# Patient Record
Sex: MALE | Race: WHITE | NOT HISPANIC OR LATINO | URBAN - METROPOLITAN AREA
[De-identification: names, ages, dates, MRNs, and addresses within clinical notes are randomized per-mention and may not be internally consistent; named-entity substitution may affect disease eponyms.]

---

## 2017-04-18 ENCOUNTER — INPATIENT (INPATIENT)
Facility: HOSPITAL | Age: 57
LOS: 29 days | Discharge: NURSING, REHAB OR LONG-TERM CARE | End: 2017-05-18
Attending: HOSPITALIST

## 2017-06-27 DIAGNOSIS — S72.041A DISPLACED FRACTURE OF BASE OF NECK OF RIGHT FEMUR, INITIAL ENCOUNTER FOR CLOSED FRACTURE: ICD-10-CM

## 2017-06-27 DIAGNOSIS — F10.229 ALCOHOL DEPENDENCE WITH INTOXICATION, UNSPECIFIED: ICD-10-CM

## 2017-06-27 DIAGNOSIS — K72.90 HEPATIC FAILURE, UNSPECIFIED WITHOUT COMA: ICD-10-CM

## 2017-06-27 DIAGNOSIS — G93.41 METABOLIC ENCEPHALOPATHY: ICD-10-CM

## 2017-06-27 DIAGNOSIS — H54.2 LOW VISION, BOTH EYES: ICD-10-CM

## 2017-06-27 DIAGNOSIS — R45.1 RESTLESSNESS AND AGITATION: ICD-10-CM

## 2017-06-27 DIAGNOSIS — R45.851 SUICIDAL IDEATIONS: ICD-10-CM

## 2017-06-27 DIAGNOSIS — F32.9 MAJOR DEPRESSIVE DISORDER, SINGLE EPISODE, UNSPECIFIED: ICD-10-CM

## 2017-06-27 DIAGNOSIS — S40.012A CONTUSION OF LEFT SHOULDER, INITIAL ENCOUNTER: ICD-10-CM
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
## 2017-06-27 DIAGNOSIS — E63.9 NUTRITIONAL DEFICIENCY, UNSPECIFIED: ICD-10-CM

## 2017-06-27 DIAGNOSIS — K70.30 ALCOHOLIC CIRRHOSIS OF LIVER WITHOUT ASCITES: ICD-10-CM

## 2017-06-27 DIAGNOSIS — Z78.1 PHYSICAL RESTRAINT STATUS: ICD-10-CM

## 2017-06-27 DIAGNOSIS — S32.030A WEDGE COMPRESSION FRACTURE OF THIRD LUMBAR VERTEBRA, INITIAL ENCOUNTER FOR CLOSED FRACTURE: ICD-10-CM

## 2017-06-27 DIAGNOSIS — Z90.49 ACQUIRED ABSENCE OF OTHER SPECIFIED PARTS OF DIGESTIVE TRACT: ICD-10-CM

## 2017-06-27 DIAGNOSIS — F10.231 ALCOHOL DEPENDENCE WITH WITHDRAWAL DELIRIUM: ICD-10-CM

## 2017-06-27 DIAGNOSIS — K70.10 ALCOHOLIC HEPATITIS WITHOUT ASCITES: ICD-10-CM

## 2017-06-27 DIAGNOSIS — M10.9 GOUT, UNSPECIFIED: ICD-10-CM

## 2017-06-27 DIAGNOSIS — F41.9 ANXIETY DISORDER, UNSPECIFIED: ICD-10-CM
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
## 2017-06-27 DIAGNOSIS — D69.6 THROMBOCYTOPENIA, UNSPECIFIED: ICD-10-CM

## 2017-06-27 DIAGNOSIS — D53.9 NUTRITIONAL ANEMIA, UNSPECIFIED: ICD-10-CM

## 2017-06-27 DIAGNOSIS — D68.9 COAGULATION DEFECT, UNSPECIFIED: ICD-10-CM

## 2017-06-27 DIAGNOSIS — E87.6 HYPOKALEMIA: ICD-10-CM
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
## 2017-06-27 DIAGNOSIS — Z63.4 DISAPPEARANCE AND DEATH OF FAMILY MEMBER: ICD-10-CM
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
## 2017-06-27 DIAGNOSIS — R19.7 DIARRHEA, UNSPECIFIED: ICD-10-CM

## 2017-06-27 DIAGNOSIS — I10 ESSENTIAL (PRIMARY) HYPERTENSION: ICD-10-CM

## 2017-06-27 SDOH — SOCIAL STABILITY - SOCIAL INSECURITY: DISSAPEARANCE AND DEATH OF FAMILY MEMBER: Z63.4

## 2017-08-10 ENCOUNTER — OUTPATIENT (OUTPATIENT)
Dept: OUTPATIENT SERVICES | Facility: HOSPITAL | Age: 57
LOS: 1 days | Discharge: HOME | End: 2017-08-10
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
## 2017-08-10 DIAGNOSIS — I20.9 ANGINA PECTORIS, UNSPECIFIED: ICD-10-CM

## 2017-08-10 DIAGNOSIS — M54.9 DORSALGIA, UNSPECIFIED: ICD-10-CM

## 2017-08-10 DIAGNOSIS — F10.20 ALCOHOL DEPENDENCE, UNCOMPLICATED: ICD-10-CM

## 2017-08-10 DIAGNOSIS — F93.0 SEPARATION ANXIETY DISORDER OF CHILDHOOD: ICD-10-CM

## 2017-08-10 DIAGNOSIS — R16.0 HEPATOMEGALY, NOT ELSEWHERE CLASSIFIED: ICD-10-CM

## 2017-08-10 DIAGNOSIS — I10 ESSENTIAL (PRIMARY) HYPERTENSION: ICD-10-CM

## 2017-08-10 DIAGNOSIS — F41.9 ANXIETY DISORDER, UNSPECIFIED: ICD-10-CM

## 2017-08-23 ENCOUNTER — OUTPATIENT (OUTPATIENT)
Dept: OUTPATIENT SERVICES | Facility: HOSPITAL | Age: 57
LOS: 1 days | Discharge: HOME | End: 2017-08-23

## 2017-08-23 DIAGNOSIS — I10 ESSENTIAL (PRIMARY) HYPERTENSION: ICD-10-CM
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
## 2017-08-23 DIAGNOSIS — M54.9 DORSALGIA, UNSPECIFIED: ICD-10-CM

## 2017-08-23 DIAGNOSIS — F10.20 ALCOHOL DEPENDENCE, UNCOMPLICATED: ICD-10-CM

## 2017-08-23 DIAGNOSIS — F93.0 SEPARATION ANXIETY DISORDER OF CHILDHOOD: ICD-10-CM

## 2017-08-23 DIAGNOSIS — F41.9 ANXIETY DISORDER, UNSPECIFIED: ICD-10-CM

## 2017-08-23 DIAGNOSIS — R16.0 HEPATOMEGALY, NOT ELSEWHERE CLASSIFIED: ICD-10-CM

## 2018-03-28 ENCOUNTER — APPOINTMENT (OUTPATIENT)
Dept: CARDIOLOGY | Facility: CLINIC | Age: 58
End: 2018-03-28

## 2018-03-28 ENCOUNTER — OUTPATIENT (OUTPATIENT)
Dept: OUTPATIENT SERVICES | Facility: HOSPITAL | Age: 58
LOS: 1 days | Discharge: HOME | End: 2018-03-28

## 2018-03-28 VITALS
DIASTOLIC BLOOD PRESSURE: 62 MMHG | WEIGHT: 200 LBS | BODY MASS INDEX: 28 KG/M2 | HEART RATE: 59 BPM | SYSTOLIC BLOOD PRESSURE: 110 MMHG | HEIGHT: 71 IN

## 2018-03-28 DIAGNOSIS — F25.9 SCHIZOAFFECTIVE DISORDER, UNSPECIFIED: ICD-10-CM

## 2018-03-28 DIAGNOSIS — Z01.818 ENCOUNTER FOR OTHER PREPROCEDURAL EXAMINATION: ICD-10-CM

## 2018-03-28 RX ORDER — LACTULOSE 10 G/15ML
10 SOLUTION ORAL DAILY
Refills: 0 | Status: ACTIVE | COMMUNITY

## 2018-03-28 RX ORDER — HALOPERIDOL 0.5 MG/1
0.5 TABLET ORAL
Refills: 0 | Status: ACTIVE | COMMUNITY

## 2018-03-28 RX ORDER — RIFAXIMIN 550 MG/1
550 TABLET ORAL TWICE DAILY
Refills: 0 | Status: ACTIVE | COMMUNITY

## 2018-03-28 RX ORDER — ENALAPRIL MALEATE 20 MG/1
20 TABLET ORAL DAILY
Refills: 0 | Status: ACTIVE | COMMUNITY

## 2018-03-28 RX ORDER — HYDROCORTISONE 1 %
12 CREAM (GRAM) TOPICAL
Refills: 0 | Status: ACTIVE | COMMUNITY

## 2018-03-28 RX ORDER — LABETALOL HYDROCHLORIDE 100 MG/1
100 TABLET, FILM COATED ORAL
Refills: 0 | Status: ACTIVE | COMMUNITY

## 2018-03-28 RX ORDER — TRAZODONE HYDROCHLORIDE 50 MG/1
50 TABLET ORAL DAILY
Refills: 0 | Status: ACTIVE | COMMUNITY

## 2018-03-28 RX ORDER — POTASSIUM CHLORIDE 750 MG/1
10 CAPSULE, EXTENDED RELEASE ORAL TWICE DAILY
Refills: 0 | Status: ACTIVE | COMMUNITY

## 2018-04-08 LAB
ANION GAP SERPL CALC-SCNC: 12 MMOL/L
BUN SERPL-MCNC: 15 MG/DL
CALCIUM SERPL-MCNC: 8.9 MG/DL
CHLORIDE SERPL-SCNC: 103 MMOL/L
CO2 SERPL-SCNC: 27 MMOL/L
CREAT SERPL-MCNC: 1 MG/DL
GLUCOSE SERPL-MCNC: 77 MG/DL
POTASSIUM SERPL-SCNC: 4.6 MMOL/L
SODIUM SERPL-SCNC: 142 MMOL/L

## 2018-04-19 ENCOUNTER — OUTPATIENT (OUTPATIENT)
Dept: OUTPATIENT SERVICES | Facility: HOSPITAL | Age: 58
LOS: 1 days | Discharge: HOME | End: 2018-04-19

## 2018-04-19 DIAGNOSIS — I10 ESSENTIAL (PRIMARY) HYPERTENSION: ICD-10-CM

## 2018-05-23 ENCOUNTER — OUTPATIENT (OUTPATIENT)
Dept: OUTPATIENT SERVICES | Facility: HOSPITAL | Age: 58
LOS: 1 days | Discharge: HOME | End: 2018-05-23

## 2018-05-23 DIAGNOSIS — S72.041A DISPLACED FRACTURE OF BASE OF NECK OF RIGHT FEMUR, INITIAL ENCOUNTER FOR CLOSED FRACTURE: ICD-10-CM

## 2018-05-24 ENCOUNTER — APPOINTMENT (OUTPATIENT)
Dept: CARDIOLOGY | Facility: CLINIC | Age: 58
End: 2018-05-24

## 2018-05-24 VITALS
WEIGHT: 200 LBS | HEART RATE: 64 BPM | BODY MASS INDEX: 28 KG/M2 | HEIGHT: 71 IN | DIASTOLIC BLOOD PRESSURE: 60 MMHG | SYSTOLIC BLOOD PRESSURE: 100 MMHG

## 2018-05-24 DIAGNOSIS — I10 ESSENTIAL (PRIMARY) HYPERTENSION: ICD-10-CM

## 2018-05-24 DIAGNOSIS — F25.9 SCHIZOAFFECTIVE DISORDER, UNSPECIFIED: ICD-10-CM

## 2018-05-24 DIAGNOSIS — Z01.818 ENCOUNTER FOR OTHER PREPROCEDURAL EXAMINATION: ICD-10-CM

## 2018-06-05 ENCOUNTER — OUTPATIENT (OUTPATIENT)
Dept: OUTPATIENT SERVICES | Facility: HOSPITAL | Age: 58
LOS: 1 days | Discharge: HOME | End: 2018-06-05

## 2018-06-05 VITALS
DIASTOLIC BLOOD PRESSURE: 87 MMHG | HEIGHT: 71 IN | WEIGHT: 178.57 LBS | RESPIRATION RATE: 16 BRPM | TEMPERATURE: 97 F | HEART RATE: 60 BPM | SYSTOLIC BLOOD PRESSURE: 134 MMHG | OXYGEN SATURATION: 98 %

## 2018-06-05 DIAGNOSIS — S72.001A FRACTURE OF UNSPECIFIED PART OF NECK OF RIGHT FEMUR, INITIAL ENCOUNTER FOR CLOSED FRACTURE: ICD-10-CM

## 2018-06-05 DIAGNOSIS — Z01.818 ENCOUNTER FOR OTHER PREPROCEDURAL EXAMINATION: ICD-10-CM

## 2018-06-05 DIAGNOSIS — K82.9 DISEASE OF GALLBLADDER, UNSPECIFIED: Chronic | ICD-10-CM

## 2018-06-05 LAB
ALBUMIN SERPL ELPH-MCNC: 4.3 G/DL — SIGNIFICANT CHANGE UP (ref 3.5–5.2)
ALP SERPL-CCNC: 106 U/L — SIGNIFICANT CHANGE UP (ref 30–115)
ALT FLD-CCNC: 12 U/L — SIGNIFICANT CHANGE UP (ref 0–41)
ANION GAP SERPL CALC-SCNC: 14 MMOL/L — SIGNIFICANT CHANGE UP (ref 7–14)
APPEARANCE UR: (no result)
APTT BLD: 31.9 SEC — SIGNIFICANT CHANGE UP (ref 27–39.2)
AST SERPL-CCNC: 16 U/L — SIGNIFICANT CHANGE UP (ref 0–41)
BASOPHILS # BLD AUTO: 0.02 K/UL — SIGNIFICANT CHANGE UP (ref 0–0.2)
BASOPHILS NFR BLD AUTO: 0.4 % — SIGNIFICANT CHANGE UP (ref 0–1)
BILIRUB SERPL-MCNC: 1.4 MG/DL — HIGH (ref 0.2–1.2)
BILIRUB UR-MCNC: NEGATIVE — SIGNIFICANT CHANGE UP
BLD GP AB SCN SERPL QL: SIGNIFICANT CHANGE UP
BUN SERPL-MCNC: 12 MG/DL — SIGNIFICANT CHANGE UP (ref 10–20)
CALCIUM SERPL-MCNC: 9.4 MG/DL — SIGNIFICANT CHANGE UP (ref 8.5–10.1)
CHLORIDE SERPL-SCNC: 107 MMOL/L — SIGNIFICANT CHANGE UP (ref 98–110)
CO2 SERPL-SCNC: 26 MMOL/L — SIGNIFICANT CHANGE UP (ref 17–32)
COLOR SPEC: YELLOW — SIGNIFICANT CHANGE UP
CREAT SERPL-MCNC: 0.9 MG/DL — SIGNIFICANT CHANGE UP (ref 0.7–1.5)
DIFF PNL FLD: NEGATIVE — SIGNIFICANT CHANGE UP
EOSINOPHIL # BLD AUTO: 0.09 K/UL — SIGNIFICANT CHANGE UP (ref 0–0.7)
EOSINOPHIL NFR BLD AUTO: 1.8 % — SIGNIFICANT CHANGE UP (ref 0–8)
EPI CELLS # UR: (no result) /HPF
GLUCOSE SERPL-MCNC: 86 MG/DL — SIGNIFICANT CHANGE UP (ref 70–99)
GLUCOSE UR QL: NEGATIVE MG/DL — SIGNIFICANT CHANGE UP
HCT VFR BLD CALC: 36.5 % — LOW (ref 42–52)
HGB BLD-MCNC: 12.3 G/DL — LOW (ref 14–18)
IMM GRANULOCYTES NFR BLD AUTO: 0.2 % — SIGNIFICANT CHANGE UP (ref 0.1–0.3)
INR BLD: 1.2 RATIO — SIGNIFICANT CHANGE UP (ref 0.65–1.3)
KETONES UR-MCNC: NEGATIVE — SIGNIFICANT CHANGE UP
LEUKOCYTE ESTERASE UR-ACNC: NEGATIVE — SIGNIFICANT CHANGE UP
LYMPHOCYTES # BLD AUTO: 1.1 K/UL — LOW (ref 1.2–3.4)
LYMPHOCYTES # BLD AUTO: 21.4 % — SIGNIFICANT CHANGE UP (ref 20.5–51.1)
MCHC RBC-ENTMCNC: 32.1 PG — HIGH (ref 27–31)
MCHC RBC-ENTMCNC: 33.7 G/DL — SIGNIFICANT CHANGE UP (ref 32–37)
MCV RBC AUTO: 95.3 FL — HIGH (ref 80–94)
MONOCYTES # BLD AUTO: 0.5 K/UL — SIGNIFICANT CHANGE UP (ref 0.1–0.6)
MONOCYTES NFR BLD AUTO: 9.7 % — HIGH (ref 1.7–9.3)
MRSA PCR RESULT.: NEGATIVE — SIGNIFICANT CHANGE UP
NEUTROPHILS # BLD AUTO: 3.41 K/UL — SIGNIFICANT CHANGE UP (ref 1.4–6.5)
NEUTROPHILS NFR BLD AUTO: 66.5 % — SIGNIFICANT CHANGE UP (ref 42.2–75.2)
NITRITE UR-MCNC: NEGATIVE — SIGNIFICANT CHANGE UP
NRBC # BLD: 0 /100 WBCS — SIGNIFICANT CHANGE UP (ref 0–0)
PH UR: 6.5 — SIGNIFICANT CHANGE UP (ref 5–8)
PLATELET # BLD AUTO: 92 K/UL — LOW (ref 130–400)
POTASSIUM SERPL-MCNC: 4.5 MMOL/L — SIGNIFICANT CHANGE UP (ref 3.5–5)
POTASSIUM SERPL-SCNC: 4.5 MMOL/L — SIGNIFICANT CHANGE UP (ref 3.5–5)
PROT SERPL-MCNC: 7.2 G/DL — SIGNIFICANT CHANGE UP (ref 6–8)
PROT UR-MCNC: NEGATIVE MG/DL — SIGNIFICANT CHANGE UP
PROTHROM AB SERPL-ACNC: 13 SEC — HIGH (ref 9.95–12.87)
RBC # BLD: 3.83 M/UL — LOW (ref 4.7–6.1)
RBC # FLD: 13.6 % — SIGNIFICANT CHANGE UP (ref 11.5–14.5)
SODIUM SERPL-SCNC: 147 MMOL/L — HIGH (ref 135–146)
SP GR SPEC: <=1.005 — SIGNIFICANT CHANGE UP (ref 1.01–1.03)
TYPE + AB SCN PNL BLD: SIGNIFICANT CHANGE UP
UROBILINOGEN FLD QL: 0.2 MG/DL — SIGNIFICANT CHANGE UP (ref 0.2–0.2)
WBC # BLD: 5.13 K/UL — SIGNIFICANT CHANGE UP (ref 4.8–10.8)
WBC # FLD AUTO: 5.13 K/UL — SIGNIFICANT CHANGE UP (ref 4.8–10.8)

## 2018-06-05 NOTE — H&P PST ADULT - HISTORY OF PRESENT ILLNESS
"right hip surgery"  PT CURRENTLY DENIES CHEST PAIN, PALPITATIONS, DYSURIA, RECENT ILLNESS  EXERCISE TOLERANCE HAS FX HIP   COULD WALK AT LEAST FIVE BLOCKS PRIOR TO FX   FRACTURED HIP OCCURRED ONE YEAR AGO  PT DENIES ANY RASHES, ABRASION, OR OPEN WOUNDS OR CUTS "right hip surgery"  PT CURRENTLY DENIES CHEST PAIN, PALPITATIONS, DYSURIA, RECENT ILLNESS  EXERCISE TOLERANCE HAS FX HIP   COULD WALK AT LEAST FIVE BLOCKS PRIOR TO FX   FRACTURED HIP OCCURRED ONE YEAR AGO  PT DENIES ANY RASHES, ABRASION, OR OPEN WOUNDS OR CUTS  AS PER THE PT, THIS IS A COMPLETE MEDICAL AND SURGICAL HX, INCLUDING MEDICATIONS PRESCRIBED AND OVER THE COUNTER  [facility]

## 2018-06-05 NOTE — H&P PST ADULT - PMH
Alcohol abuse  LAST DRINK APPROX ONE YEAR AGO  Constipation    HTN (hypertension)    Schizo affective schizophrenia  DEPRESSSION ANXIETY  Sciatic leg pain  RIGHT

## 2018-06-05 NOTE — H&P PST ADULT - ATTENDING COMMENTS
Orthopedic Attending  Risks, benefits and alternative to surgical management of the patient's arthritis right hip with a right total hip were again reviewed with patient, his questions answered to his satisfaction and he wishes to proceed with proposed surgery today.

## 2018-06-05 NOTE — CONSULT NOTE ADULT - SUBJECTIVE AND OBJECTIVE BOX
Patient is a 57y old  Male who presents to dental clinic for consultation from pre-surgical testing #19 broken root tips. Patient is scheduled to have surgery this monday for hips.    HPI:  "right hip surgery"  PT CURRENTLY DENIES CHEST PAIN, PALPITATIONS, DYSURIA, RECENT ILLNESS  EXERCISE TOLERANCE HAS FX HIP   COULD WALK AT LEAST FIVE BLOCKS PRIOR TO FX   FRACTURED HIP OCCURRED ONE YEAR AGO  PT DENIES ANY RASHES, ABRASION, OR OPEN WOUNDS OR CUTS  AS PER THE PT, THIS IS A COMPLETE MEDICAL AND SURGICAL HX, INCLUDING MEDICATIONS PRESCRIBED AND OVER THE COUNTER  most [facility] from [facility] (05 Jun 2018 11:32)      PAST MEDICAL & SURGICAL HISTORY:  Sciatic leg pain: RIGHT  Constipation  Schizo affective schizophrenia: DEPRESSSION ANXIETY  HTN (hypertension)  Alcohol abuse: LAST DRINK APPROX ONE YEAR AGO  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    ( -  ) heart valve replacement  ( -  ) joint replacement      MEDICATIONS  (STANDING):    · 	traZODone 50 mg oral tablet: Last Dose Taken:  , 1 tab(s) orally once a day  · 	enalapril 20 mg oral tablet: Last Dose Taken:  , 1 tab(s) orally once a day  · 	labetalol 100 mg oral tablet: Last Dose Taken:  , 1 tab(s) orally 2 times a day  · 	lactulose 10 g/15 mL oral syrup: Last Dose Taken:  , 30 milliliter(s) orally once a day  · 	Xifaxan 550 mg oral tablet: Last Dose Taken:  , 1 tab(s) orally 2 times a day  · 	potassium chloride 10 mEq oral capsule, extended release: Last Dose Taken:  , 1 cap(s) orally 2 times a day  · 	ammonium lactate 12% topical cream: Last Dose Taken:  , Apply topically to affected area once a day          ·      acetaminophen 325 mg oral capsule: Last Dose Taken:  , 2 cap(s) orally every 6 hours, As Needed    MEDICATIONS  (PRN):      Allergies    No Known Allergies    Intolerances        FAMILY HISTORY:  No pertinent family history in first degree relatives      *SOCIAL HISTORY: patient came in with aide      Vital Signs Last 24 Hrs  T(C): 36.1 (05 Jun 2018 11:32), Max: 36.1 (05 Jun 2018 11:32)  T(F): 97 (05 Jun 2018 11:32), Max: 97 (05 Jun 2018 11:32)  HR: 60 (05 Jun 2018 11:32) (60 - 60)  BP: 134/87 (05 Jun 2018 11:32) (134/87 - 134/87)  BP(mean): 102 (05 Jun 2018 11:32) (102 - 102)  RR: 16 (05 Jun 2018 11:32) (16 - 16)  SpO2: 98% (05 Jun 2018 11:32) (98% - 98%)    EOE:  TMJ (  - ) clicks                    (  -  ) pops                    (  -  ) crepitus             Mandible <<FROM>>             Facial bones and MOM <<grossly intact>>             ( -  ) trismus             ( -  ) LAD             ( -  ) swelling             ( -  ) asymmetry             ( -  ) palpation             ( -  ) SOB             ( -  ) dysphagia             ( -  ) LOC    IOE:  <<permanent/primary/mixed>> dentition:  OR <<multiple carious teeth and multiple missing teeth>>           hard/soft palate:  ( -  ) palatal torus, <<No pathology noted>>           tongue/FOM <<No pathology noted>>           labial/buccal mucosa <<No pathology noted>>    patient has several missing and carious and broken teeth. Patient has no particular pain but loose root tips on #19 that is requested to be removed before surgery for hips this monday.             ( -  ) percussion           ( -  ) palpation           ( -  ) swelling            ( -  ) abscess           ( -  ) sinus tract    Dentition present: multiple missing teeth  Mobility: #19 mobile root tips  Caries: multiple carious teeth throughout mouth        *DENTAL RADIOGRAPHS: Periapical #19 star tips      *ASSESSMENT: Patient is a 57y old  Male who presents to dental clinic for consultation from pre-surgical testing #19 broken root tips. Patient is scheduled to have surgery this monday for hips. Explained to patient root tips on #19 need to be removed before surgery on monday due to possible aspiration risk. Patient understood. Explained risks and benefits of procedure and treatment consequences as per OS sheet dated 7-. Side sites and consents signed and verified.    PROCEDURE:  Extraction #19 root tips  Verbal and written consent given.  Anesthesia: Administered 2 carpules of 2% lidocaine with 1:100K epinephrine via local infiltration.   Treatment: Elevated and extracted #19 root tips with forceps. Hemostasis achieved. Post-op instructions given. Post-op radiograph taken. No complications.  Informed patient will need in future dental work done due to multiple carious and broken teeth.    RECOMMENDATIONS:  1) Continued care per medical team  2) Dental F/U with outpatient dentist for comprehensive dental care.   3) If any difficulty swallowing/breathing, fever occur, return to ER.     Guzman Mckeon DDS Patient is a 57y old  Male who presents to dental clinic for consultation from pre-surgical testing #19 broken root tips. Patient is scheduled to have surgery this monday for hips.    HPI:  "right hip surgery"  PT CURRENTLY DENIES CHEST PAIN, PALPITATIONS, DYSURIA, RECENT ILLNESS  EXERCISE TOLERANCE HAS FX HIP   COULD WALK AT LEAST FIVE BLOCKS PRIOR TO FX   FRACTURED HIP OCCURRED ONE YEAR AGO  PT DENIES ANY RASHES, ABRASION, OR OPEN WOUNDS OR CUTS  AS PER THE PT, THIS IS A COMPLETE MEDICAL AND SURGICAL HX, INCLUDING MEDICATIONS PRESCRIBED AND OVER THE COUNTER  [facility]h from [facility] (05 Jun 2018 11:32)      PAST MEDICAL & SURGICAL HISTORY:  Sciatic leg pain: RIGHT  Constipation  Schizo affective schizophrenia: DEPRESSSION ANXIETY  HTN (hypertension)  Alcohol abuse: LAST DRINK APPROX ONE YEAR AGO  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    ( -  ) heart valve replacement  ( -  ) joint replacement      MEDICATIONS  (STANDING):    · 	traZODone 50 mg oral tablet: Last Dose Taken:  , 1 tab(s) orally once a day  · 	enalapril 20 mg oral tablet: Last Dose Taken:  , 1 tab(s) orally once a day  · 	labetalol 100 mg oral tablet: Last Dose Taken:  , 1 tab(s) orally 2 times a day  · 	lactulose 10 g/15 mL oral syrup: Last Dose Taken:  , 30 milliliter(s) orally once a day  · 	Xifaxan 550 mg oral tablet: Last Dose Taken:  , 1 tab(s) orally 2 times a day  · 	potassium chloride 10 mEq oral capsule, extended release: Last Dose Taken:  , 1 cap(s) orally 2 times a day  · 	ammonium lactate 12% topical cream: Last Dose Taken:  , Apply topically to affected area once a day          ·      acetaminophen 325 mg oral capsule: Last Dose Taken:  , 2 cap(s) orally every 6 hours, As Needed    MEDICATIONS  (PRN):      Allergies    No Known Allergies    Intolerances        FAMILY HISTORY:  No pertinent family history in first degree relatives      *SOCIAL HISTORY: patient came in with aide      Vital Signs Last 24 Hrs  T(C): 36.1 (05 Jun 2018 11:32), Max: 36.1 (05 Jun 2018 11:32)  T(F): 97 (05 Jun 2018 11:32), Max: 97 (05 Jun 2018 11:32)  HR: 60 (05 Jun 2018 11:32) (60 - 60)  BP: 134/87 (05 Jun 2018 11:32) (134/87 - 134/87)  BP(mean): 102 (05 Jun 2018 11:32) (102 - 102)  RR: 16 (05 Jun 2018 11:32) (16 - 16)  SpO2: 98% (05 Jun 2018 11:32) (98% - 98%)    EOE:  TMJ (  - ) clicks                    (  -  ) pops                    (  -  ) crepitus             Mandible <<FROM>>             Facial bones and MOM <<grossly intact>>             ( -  ) trismus             ( -  ) LAD             ( -  ) swelling             ( -  ) asymmetry             ( -  ) palpation             ( -  ) SOB             ( -  ) dysphagia             ( -  ) LOC    IOE:  <<permanent/primary/mixed>> dentition:  OR <<multiple carious teeth and multiple missing teeth>>           hard/soft palate:  ( -  ) palatal torus, <<No pathology noted>>           tongue/FOM <<No pathology noted>>           labial/buccal mucosa <<No pathology noted>>    patient has several missing and carious and broken teeth. Patient has no particular pain but loose root tips on #19 that is requested to be removed before surgery for hips this monday.             ( -  ) percussion           ( -  ) palpation           ( -  ) swelling            ( -  ) abscess           ( -  ) sinus tract    Dentition present: multiple missing teeth  Mobility: #19 mobile root tips  Caries: multiple carious teeth throughout mouth        *DENTAL RADIOGRAPHS: Periapical #19 star tips      *ASSESSMENT: Patient is a 57y old  Male who presents to dental clinic for consultation from pre-surgical testing #19 broken root tips. Patient is scheduled to have surgery this monday for hips. Explained to patient root tips on #19 need to be removed before surgery on monday due to possible aspiration risk. Patient understood. Explained risks and benefits of procedure and treatment consequences as per OS sheet dated 7-. Side sites and consents signed and verified.    PROCEDURE:  Extraction #19 root tips  Verbal and written consent given. Blood Pressure: 118/70  Anesthesia: Administered 2 carpules of 2% lidocaine with 1:100K epinephrine via local infiltration.   Treatment: Elevated and extracted #19 root tips with forceps. Hemostasis achieved. Post-op instructions given. Post-op radiograph taken. No complications.  Informed patient will need in future dental work done due to multiple carious and broken teeth.    RECOMMENDATIONS:  1) Continued care per medical team  2) Dental F/U with outpatient dentist for comprehensive dental care.   3) If any difficulty swallowing/breathing, fever occur, return to ER.     Guzman Mckeon DDS

## 2018-06-07 LAB
-  AMPICILLIN/SULBACTAM: SIGNIFICANT CHANGE UP
-  CEFAZOLIN: SIGNIFICANT CHANGE UP
-  CIPROFLOXACIN: SIGNIFICANT CHANGE UP
-  GENTAMICIN: SIGNIFICANT CHANGE UP
-  LEVOFLOXACIN: SIGNIFICANT CHANGE UP
-  NITROFURANTOIN: SIGNIFICANT CHANGE UP
-  OXACILLIN: SIGNIFICANT CHANGE UP
-  PENICILLIN: SIGNIFICANT CHANGE UP
-  RIFAMPIN: SIGNIFICANT CHANGE UP
-  TETRACYCLINE: SIGNIFICANT CHANGE UP
-  TRIMETHOPRIM/SULFAMETHOXAZOLE: SIGNIFICANT CHANGE UP
-  VANCOMYCIN: SIGNIFICANT CHANGE UP
CULTURE RESULTS: SIGNIFICANT CHANGE UP
METHOD TYPE: SIGNIFICANT CHANGE UP
ORGANISM # SPEC MICROSCOPIC CNT: SIGNIFICANT CHANGE UP
ORGANISM # SPEC MICROSCOPIC CNT: SIGNIFICANT CHANGE UP
SPECIMEN SOURCE: SIGNIFICANT CHANGE UP

## 2018-06-11 ENCOUNTER — INPATIENT (INPATIENT)
Facility: HOSPITAL | Age: 58
LOS: 3 days | Discharge: NURSING, REHAB OR LONG-TERM CARE | End: 2018-06-15
Attending: ORTHOPAEDIC SURGERY | Admitting: ORTHOPAEDIC SURGERY

## 2018-06-11 ENCOUNTER — RESULT REVIEW (OUTPATIENT)
Age: 58
End: 2018-06-11

## 2018-06-11 VITALS
TEMPERATURE: 98 F | RESPIRATION RATE: 18 BRPM | SYSTOLIC BLOOD PRESSURE: 146 MMHG | WEIGHT: 199.96 LBS | DIASTOLIC BLOOD PRESSURE: 89 MMHG | HEIGHT: 71 IN | HEART RATE: 58 BPM
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
## 2018-06-11 DIAGNOSIS — K82.9 DISEASE OF GALLBLADDER, UNSPECIFIED: Chronic | ICD-10-CM

## 2018-06-11 LAB
ANION GAP SERPL CALC-SCNC: 6 MMOL/L — LOW (ref 7–14)
BUN SERPL-MCNC: 13 MG/DL — SIGNIFICANT CHANGE UP (ref 10–20)
CALCIUM SERPL-MCNC: 7.8 MG/DL — LOW (ref 8.5–10.1)
CHLORIDE SERPL-SCNC: 105 MMOL/L — SIGNIFICANT CHANGE UP (ref 98–110)
CO2 SERPL-SCNC: 24 MMOL/L — SIGNIFICANT CHANGE UP (ref 17–32)
CREAT SERPL-MCNC: 0.9 MG/DL — SIGNIFICANT CHANGE UP (ref 0.7–1.5)
GLUCOSE SERPL-MCNC: 120 MG/DL — HIGH (ref 70–99)
HCT VFR BLD CALC: 28.1 % — LOW (ref 42–52)
HGB BLD-MCNC: 9.6 G/DL — LOW (ref 14–18)
MCHC RBC-ENTMCNC: 31.7 PG — HIGH (ref 27–31)
MCHC RBC-ENTMCNC: 34.2 G/DL — SIGNIFICANT CHANGE UP (ref 32–37)
MCV RBC AUTO: 92.7 FL — SIGNIFICANT CHANGE UP (ref 80–94)
NRBC # BLD: 0 /100 WBCS — SIGNIFICANT CHANGE UP (ref 0–0)
PLATELET # BLD AUTO: 85 K/UL — LOW (ref 130–400)
POTASSIUM SERPL-MCNC: 4.6 MMOL/L — SIGNIFICANT CHANGE UP (ref 3.5–5)
POTASSIUM SERPL-SCNC: 4.6 MMOL/L — SIGNIFICANT CHANGE UP (ref 3.5–5)
RBC # BLD: 3.03 M/UL — LOW (ref 4.7–6.1)
RBC # FLD: 14.6 % — HIGH (ref 11.5–14.5)
SODIUM SERPL-SCNC: 135 MMOL/L — SIGNIFICANT CHANGE UP (ref 135–146)
WBC # BLD: 10.11 K/UL — SIGNIFICANT CHANGE UP (ref 4.8–10.8)
WBC # FLD AUTO: 10.11 K/UL — SIGNIFICANT CHANGE UP (ref 4.8–10.8)

## 2018-06-11 RX ORDER — OXYCODONE HYDROCHLORIDE 5 MG/1
10 TABLET ORAL EVERY 4 HOURS
Qty: 0 | Refills: 0 | Status: DISCONTINUED | OUTPATIENT
Start: 2018-06-11 | End: 2018-06-15

## 2018-06-11 RX ORDER — SOD,AMMONIUM,POTASSIUM LACTATE
1 CREAM (GRAM) TOPICAL
Qty: 0 | Refills: 0 | Status: DISCONTINUED | OUTPATIENT
Start: 2018-06-11 | End: 2018-06-15

## 2018-06-11 RX ORDER — HYDROMORPHONE HYDROCHLORIDE 2 MG/ML
0.5 INJECTION INTRAMUSCULAR; INTRAVENOUS; SUBCUTANEOUS
Qty: 0 | Refills: 0 | Status: DISCONTINUED | OUTPATIENT
Start: 2018-06-11 | End: 2018-06-11

## 2018-06-11 RX ORDER — MORPHINE SULFATE 50 MG/1
2 CAPSULE, EXTENDED RELEASE ORAL
Qty: 0 | Refills: 0 | Status: DISCONTINUED | OUTPATIENT
Start: 2018-06-11 | End: 2018-06-15

## 2018-06-11 RX ORDER — MAGNESIUM HYDROXIDE 400 MG/1
30 TABLET, CHEWABLE ORAL DAILY
Qty: 0 | Refills: 0 | Status: DISCONTINUED | OUTPATIENT
Start: 2018-06-11 | End: 2018-06-15

## 2018-06-11 RX ORDER — CEFAZOLIN SODIUM 1 G
2000 VIAL (EA) INJECTION EVERY 8 HOURS
Qty: 0 | Refills: 0 | Status: COMPLETED | OUTPATIENT
Start: 2018-06-11 | End: 2018-06-12

## 2018-06-11 RX ORDER — MEPERIDINE HYDROCHLORIDE 50 MG/ML
12.5 INJECTION INTRAMUSCULAR; INTRAVENOUS; SUBCUTANEOUS
Qty: 0 | Refills: 0 | Status: DISCONTINUED | OUTPATIENT
Start: 2018-06-11 | End: 2018-06-11

## 2018-06-11 RX ORDER — ONDANSETRON 8 MG/1
4 TABLET, FILM COATED ORAL EVERY 6 HOURS
Qty: 0 | Refills: 0 | Status: DISCONTINUED | OUTPATIENT
Start: 2018-06-11 | End: 2018-06-15

## 2018-06-11 RX ORDER — LABETALOL HCL 100 MG
100 TABLET ORAL
Qty: 0 | Refills: 0 | Status: DISCONTINUED | OUTPATIENT
Start: 2018-06-11 | End: 2018-06-15

## 2018-06-11 RX ORDER — METOCLOPRAMIDE HCL 10 MG
10 TABLET ORAL ONCE
Qty: 0 | Refills: 0 | Status: COMPLETED | OUTPATIENT
Start: 2018-06-11 | End: 2018-06-11

## 2018-06-11 RX ORDER — POTASSIUM CHLORIDE 20 MEQ
20 PACKET (EA) ORAL
Qty: 0 | Refills: 0 | Status: DISCONTINUED | OUTPATIENT
Start: 2018-06-11 | End: 2018-06-15

## 2018-06-11 RX ORDER — HYDROMORPHONE HYDROCHLORIDE 2 MG/ML
1 INJECTION INTRAMUSCULAR; INTRAVENOUS; SUBCUTANEOUS
Qty: 0 | Refills: 0 | Status: DISCONTINUED | OUTPATIENT
Start: 2018-06-11 | End: 2018-06-11

## 2018-06-11 RX ORDER — DIPHENHYDRAMINE HCL 50 MG
25 CAPSULE ORAL AT BEDTIME
Qty: 0 | Refills: 0 | Status: DISCONTINUED | OUTPATIENT
Start: 2018-06-11 | End: 2018-06-15

## 2018-06-11 RX ORDER — SODIUM CHLORIDE 9 MG/ML
1000 INJECTION, SOLUTION INTRAVENOUS
Qty: 0 | Refills: 0 | Status: DISCONTINUED | OUTPATIENT
Start: 2018-06-11 | End: 2018-06-11

## 2018-06-11 RX ORDER — LACTULOSE 10 G/15ML
30 SOLUTION ORAL DAILY
Qty: 0 | Refills: 0 | Status: DISCONTINUED | OUTPATIENT
Start: 2018-06-11 | End: 2018-06-15

## 2018-06-11 RX ORDER — OXYCODONE HYDROCHLORIDE 5 MG/1
5 TABLET ORAL EVERY 4 HOURS
Qty: 0 | Refills: 0 | Status: DISCONTINUED | OUTPATIENT
Start: 2018-06-11 | End: 2018-06-15

## 2018-06-11 RX ORDER — SENNA PLUS 8.6 MG/1
2 TABLET ORAL AT BEDTIME
Qty: 0 | Refills: 0 | Status: DISCONTINUED | OUTPATIENT
Start: 2018-06-11 | End: 2018-06-15

## 2018-06-11 RX ORDER — TRAZODONE HCL 50 MG
50 TABLET ORAL DAILY
Qty: 0 | Refills: 0 | Status: DISCONTINUED | OUTPATIENT
Start: 2018-06-11 | End: 2018-06-15

## 2018-06-11 RX ORDER — ONDANSETRON 8 MG/1
4 TABLET, FILM COATED ORAL ONCE
Qty: 0 | Refills: 0 | Status: COMPLETED | OUTPATIENT
Start: 2018-06-11 | End: 2018-06-11

## 2018-06-11 RX ORDER — ENOXAPARIN SODIUM 100 MG/ML
40 INJECTION SUBCUTANEOUS EVERY 24 HOURS
Qty: 0 | Refills: 0 | Status: DISCONTINUED | OUTPATIENT
Start: 2018-06-11 | End: 2018-06-15

## 2018-06-11 RX ORDER — ACETAMINOPHEN 500 MG
650 TABLET ORAL EVERY 6 HOURS
Qty: 0 | Refills: 0 | Status: DISCONTINUED | OUTPATIENT
Start: 2018-06-11 | End: 2018-06-15

## 2018-06-11 RX ORDER — POLYETHYLENE GLYCOL 3350 17 G/17G
17 POWDER, FOR SOLUTION ORAL DAILY
Qty: 0 | Refills: 0 | Status: DISCONTINUED | OUTPATIENT
Start: 2018-06-11 | End: 2018-06-15

## 2018-06-11 RX ORDER — DOCUSATE SODIUM 100 MG
100 CAPSULE ORAL THREE TIMES A DAY
Qty: 0 | Refills: 0 | Status: DISCONTINUED | OUTPATIENT
Start: 2018-06-11 | End: 2018-06-15

## 2018-06-11 RX ORDER — PANTOPRAZOLE SODIUM 20 MG/1
40 TABLET, DELAYED RELEASE ORAL DAILY
Qty: 0 | Refills: 0 | Status: DISCONTINUED | OUTPATIENT
Start: 2018-06-11 | End: 2018-06-15

## 2018-06-11 RX ADMIN — Medication 100 MILLIGRAM(S): at 19:10

## 2018-06-11 RX ADMIN — HYDROMORPHONE HYDROCHLORIDE 0.5 MILLIGRAM(S): 2 INJECTION INTRAMUSCULAR; INTRAVENOUS; SUBCUTANEOUS at 15:37

## 2018-06-11 RX ADMIN — Medication 10 MILLIGRAM(S): at 15:58

## 2018-06-11 RX ADMIN — HYDROMORPHONE HYDROCHLORIDE 0.5 MILLIGRAM(S): 2 INJECTION INTRAMUSCULAR; INTRAVENOUS; SUBCUTANEOUS at 15:35

## 2018-06-11 RX ADMIN — ONDANSETRON 4 MILLIGRAM(S): 8 TABLET, FILM COATED ORAL at 15:02

## 2018-06-11 RX ADMIN — HYDROMORPHONE HYDROCHLORIDE 0.5 MILLIGRAM(S): 2 INJECTION INTRAMUSCULAR; INTRAVENOUS; SUBCUTANEOUS at 15:23

## 2018-06-11 RX ADMIN — SODIUM CHLORIDE 100 MILLILITER(S): 9 INJECTION, SOLUTION INTRAVENOUS at 14:30

## 2018-06-11 RX ADMIN — HYDROMORPHONE HYDROCHLORIDE 0.5 MILLIGRAM(S): 2 INJECTION INTRAMUSCULAR; INTRAVENOUS; SUBCUTANEOUS at 16:14

## 2018-06-11 NOTE — BRIEF OPERATIVE NOTE - PROCEDURE
<<-----Click on this checkbox to enter Procedure Total hip replacement  06/11/2018  right total hip replaced of chronic femoral neck fracture (akin to right total hip after prior surgery) CPT code 84907  Active  [email or web address]

## 2018-06-11 NOTE — BRIEF OPERATIVE NOTE - PRE-OP DX
Closed fracture of neck of right femur, sequela  06/11/2018  chronic right femoral neck fracture  Active  Yoel Heller

## 2018-06-11 NOTE — ANESTHESIA FOLLOW-UP NOTE - NSEVALATIONFT_GEN_ALL_CORE
BP initial: 85/49, repeat 95/55 mmHg  P 64  R 9 temp 97.6  O2 sat 95%  Patient drowsy, lying in stretcher in No apparent distress. No complaints of pain at this time.

## 2018-06-11 NOTE — BRIEF OPERATIVE NOTE - OPERATION/FINDINGS
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

## 2018-06-11 NOTE — PROGRESS NOTE ADULT - SUBJECTIVE AND OBJECTIVE BOX
[facility]  POST OP CHECK     T(C): 35.9 (06-11-18 @ 17:30), Max: 37.2 (06-11-18 @ 16:15)  HR: 87 (06-11-18 @ 17:30) (58 - 87)  BP: 104/58 (06-11-18 @ 17:30) (85/49 - 146/89)  RR: 18 (06-11-18 @ 17:30) (10 - 21)  SpO2: 97% (06-11-18 @ 16:50) (96% - 100%)                        9.6    10.11 )-----------( 85       ( 11 Jun 2018 14:50 )             28.1     06-11    135  |  105  |  13  ----------------------------<  120<H>  4.6   |  24  |  0.9    Ca    7.8<L>      11 Jun 2018 14:50            S/P [facility]  PAIN CONTROLLED  VSS   A&O X3  DRESSING C/D/I  NVI  ANTIBIOTICS INFUSED  DVT PROPHYLAXIS ORDERED LOV/ASA  ENCOURAGE INCENTIVE SPIROMETRY  AM LABS  REHAB TO BEGIN IN THE AM ANT HIP PRECAUTIONS WBAT  D/C PLANNING

## 2018-06-12 LAB
ANION GAP SERPL CALC-SCNC: 10 MMOL/L — SIGNIFICANT CHANGE UP (ref 7–14)
BUN SERPL-MCNC: 15 MG/DL — SIGNIFICANT CHANGE UP (ref 10–20)
CALCIUM SERPL-MCNC: 8.1 MG/DL — LOW (ref 8.5–10.1)
CHLORIDE SERPL-SCNC: 101 MMOL/L — SIGNIFICANT CHANGE UP (ref 98–110)
CO2 SERPL-SCNC: 26 MMOL/L — SIGNIFICANT CHANGE UP (ref 17–32)
CREAT SERPL-MCNC: 0.9 MG/DL — SIGNIFICANT CHANGE UP (ref 0.7–1.5)
GLUCOSE SERPL-MCNC: 129 MG/DL — HIGH (ref 70–99)
HCT VFR BLD CALC: 25.5 % — LOW (ref 42–52)
HGB BLD-MCNC: 8.7 G/DL — LOW (ref 14–18)
MCHC RBC-ENTMCNC: 31.3 PG — HIGH (ref 27–31)
MCHC RBC-ENTMCNC: 34.1 G/DL — SIGNIFICANT CHANGE UP (ref 32–37)
MCV RBC AUTO: 91.7 FL — SIGNIFICANT CHANGE UP (ref 80–94)
NRBC # BLD: 0 /100 WBCS — SIGNIFICANT CHANGE UP (ref 0–0)
PLATELET # BLD AUTO: 72 K/UL — LOW (ref 130–400)
POTASSIUM SERPL-MCNC: 4.5 MMOL/L — SIGNIFICANT CHANGE UP (ref 3.5–5)
POTASSIUM SERPL-SCNC: 4.5 MMOL/L — SIGNIFICANT CHANGE UP (ref 3.5–5)
RBC # BLD: 2.78 M/UL — LOW (ref 4.7–6.1)
RBC # FLD: 14.6 % — HIGH (ref 11.5–14.5)
SODIUM SERPL-SCNC: 137 MMOL/L — SIGNIFICANT CHANGE UP (ref 135–146)
WBC # BLD: 13.29 K/UL — HIGH (ref 4.8–10.8)
WBC # FLD AUTO: 13.29 K/UL — HIGH (ref 4.8–10.8)

## 2018-06-12 RX ADMIN — Medication 50 MILLIGRAM(S): at 12:06

## 2018-06-12 RX ADMIN — Medication 100 MILLIGRAM(S): at 17:41

## 2018-06-12 RX ADMIN — PANTOPRAZOLE SODIUM 40 MILLIGRAM(S): 20 TABLET, DELAYED RELEASE ORAL at 12:06

## 2018-06-12 RX ADMIN — OXYCODONE HYDROCHLORIDE 5 MILLIGRAM(S): 5 TABLET ORAL at 00:29

## 2018-06-12 RX ADMIN — OXYCODONE HYDROCHLORIDE 5 MILLIGRAM(S): 5 TABLET ORAL at 01:27

## 2018-06-12 RX ADMIN — POLYETHYLENE GLYCOL 3350 17 GRAM(S): 17 POWDER, FOR SOLUTION ORAL at 12:06

## 2018-06-12 RX ADMIN — Medication 100 MILLIGRAM(S): at 06:53

## 2018-06-12 RX ADMIN — Medication 100 MILLIGRAM(S): at 21:21

## 2018-06-12 RX ADMIN — LACTULOSE 30 GRAM(S): 10 SOLUTION ORAL at 12:06

## 2018-06-12 RX ADMIN — ENOXAPARIN SODIUM 40 MILLIGRAM(S): 100 INJECTION SUBCUTANEOUS at 21:21

## 2018-06-12 RX ADMIN — Medication 100 MILLIGRAM(S): at 03:34

## 2018-06-12 RX ADMIN — ENOXAPARIN SODIUM 40 MILLIGRAM(S): 100 INJECTION SUBCUTANEOUS at 00:31

## 2018-06-12 RX ADMIN — Medication 100 MILLIGRAM(S): at 13:23

## 2018-06-12 RX ADMIN — Medication 650 MILLIGRAM(S): at 23:27

## 2018-06-12 RX ADMIN — Medication 20 MILLIGRAM(S): at 06:56

## 2018-06-12 NOTE — PROGRESS NOTE ADULT - SUBJECTIVE AND OBJECTIVE BOX
s/p right thr pod 1   pt seen at bedside no complaints   pt has ho for failed void           Vital Signs Last 24 Hrs  T(C): 37.4 (11 Jun 2018 23:56), Max: 37.4 (11 Jun 2018 23:56)  T(F): 99.4 (11 Jun 2018 23:56), Max: 99.4 (11 Jun 2018 23:56)  HR: 99 (12 Jun 2018 06:51) (58 - 101)  BP: 120/63 (12 Jun 2018 06:51) (85/49 - 146/89)  BP(mean): 74 (11 Jun 2018 16:50) (74 - 84)  RR: 18 (11 Jun 2018 23:56) (10 - 21)  SpO2: 97% (11 Jun 2018 16:50) (96% - 100%)    Affected extremity:          Dressing:  clean/dry/intact           Sensation: intact to light touch           NVID          abduction pillow in place         warm well perfused; capillary refill <3 seconds         sequentials x 2     LABS:                        9.6    10.11 )-----------( 85       ( 11 Jun 2018 14:50 )             28.1     06-11    135  |  105  |  13  ----------------------------<  120<H>  4.6   |  24  |  0.9    Ca    7.8<L>      11 Jun 2018 14:50            MEDICATIONS:acetaminophen   Tablet 650 milliGRAM(s) Oral every 6 hours PRN  aluminum hydroxide/magnesium hydroxide/simethicone Suspension 30 milliLiter(s) Oral four times a day PRN  ammonium lactate 12% Lotion 1 Application(s) Topical two times a day PRN  diphenhydrAMINE   Capsule 25 milliGRAM(s) Oral at bedtime PRN  docusate sodium 100 milliGRAM(s) Oral three times a day  enalapril 20 milliGRAM(s) Oral daily  enoxaparin Injectable 40 milliGRAM(s) SubCutaneous every 24 hours  labetalol 100 milliGRAM(s) Oral two times a day  lactulose Syrup 30 Gram(s) Oral daily  magnesium hydroxide Suspension 30 milliLiter(s) Oral daily PRN  morphine  - Injectable 2 milliGRAM(s) IV Push every 3 hours PRN  ondansetron Injectable 4 milliGRAM(s) IV Push every 6 hours PRN  oxyCODONE    IR 5 milliGRAM(s) Oral every 4 hours PRN  oxyCODONE    IR 10 milliGRAM(s) Oral every 4 hours PRN  pantoprazole    Tablet 40 milliGRAM(s) Oral daily  polyethylene glycol 3350 17 Gram(s) Oral daily  potassium chloride    Tablet ER 20 milliEquivalent(s) Oral two times a day  rifaximin 550 milliGRAM(s) Oral two times a day  senna 2 Tablet(s) Oral at bedtime PRN  traZODone 50 milliGRAM(s) Oral daily    Anticoagulation:  enoxaparin Injectable 40 milliGRAM(s) SubCutaneous every 24 hours      Antibiotics:   rifaximin 550 milliGRAM(s) Oral two times a day      Pain medications:   acetaminophen   Tablet 650 milliGRAM(s) Oral every 6 hours PRN  diphenhydrAMINE   Capsule 25 milliGRAM(s) Oral at bedtime PRN  morphine  - Injectable 2 milliGRAM(s) IV Push every 3 hours PRN  ondansetron Injectable 4 milliGRAM(s) IV Push every 6 hours PRN  oxyCODONE    IR 5 milliGRAM(s) Oral every 4 hours PRN  oxyCODONE    IR 10 milliGRAM(s) Oral every 4 hours PRN  traZODone 50 milliGRAM(s) Oral daily          A/P  s/p right thr pod 1     pain control   gi dvt proph   pt rehab wbat with hip precautions   pillow in place   pt rehab   tov tonight at midnight   dc planning

## 2018-06-12 NOTE — CONSULT NOTE ADULT - SUBJECTIVE AND OBJECTIVE BOX
Patient is a 57y old  Male who presents with a chief complaint of pain R hip  HPI: Hx R hip fx no OR secondary to psych hx- at [facility] now admitted to [facility] 6/11 SP R THR      PAST MEDICAL & SURGICAL HISTORY:  Sciatic leg pain: RIGHT  Constipation  Schizo affective schizophrenia: DEPRESSSION ANXIETY  HTN (hypertension)  Alcohol abuse: LAST DRINK APPROX ONE YEAR AGO  Gall bladder disease: [facility] Course: post op course -with ho for TOV    TODAY'S SUBJECTIVE & REVIEW OF SYMPTOMS:     Constitutional WNL   Cardio WNL   Resp WNL   GI WNL  Heme WNL  Endo WNL  Skin WNL  MSK WNL  Neuro WNL  Cognitive WNL  Psych hx schizophrenia  + urine incontinence    MEDICATIONS  (STANDING):  docusate sodium 100 milliGRAM(s) Oral three times a day  enalapril 20 milliGRAM(s) Oral daily  enoxaparin Injectable 40 milliGRAM(s) SubCutaneous every 24 hours  labetalol 100 milliGRAM(s) Oral two times a day  lactulose Syrup 30 Gram(s) Oral daily  pantoprazole    Tablet 40 milliGRAM(s) Oral daily  polyethylene glycol 3350 17 Gram(s) Oral daily  potassium chloride    Tablet ER 20 milliEquivalent(s) Oral two times a day  rifaximin 550 milliGRAM(s) Oral two times a day  traZODone 50 milliGRAM(s) Oral daily    MEDICATIONS  (PRN):  acetaminophen   Tablet 650 milliGRAM(s) Oral every 6 hours PRN For Temp over 38.3 C (100.94 F)  aluminum hydroxide/magnesium hydroxide/simethicone Suspension 30 milliLiter(s) Oral four times a day PRN Indigestion  ammonium lactate 12% Lotion 1 Application(s) Topical two times a day PRN Wound Care  diphenhydrAMINE   Capsule 25 milliGRAM(s) Oral at bedtime PRN Insomnia  magnesium hydroxide Suspension 30 milliLiter(s) Oral daily PRN Constipation  morphine  - Injectable 2 milliGRAM(s) IV Push every 3 hours PRN Severe Pain  ondansetron Injectable 4 milliGRAM(s) IV Push every 6 hours PRN Nausea and/or Vomiting  oxyCODONE    IR 5 milliGRAM(s) Oral every 4 hours PRN Mild Pain  oxyCODONE    IR 10 milliGRAM(s) Oral every 4 hours PRN Moderate Pain  senna 2 Tablet(s) Oral at bedtime PRN Constipation      FAMILY HISTORY:  No pertinent family history in first degree relatives      Allergies    No Known Allergies    Intolerances        SOCIAL HISTORY:    [    ] Etoh  [    ] Smoking  [    ] Substance abuse     Home Environment:  [    ] Home Alone  [    ] Lives with Family  [    ] Home Health Aid    Dwelling:  [    ] Apartment  [    ] Private House  [    ] Adult Home  [  x  ] Skilled Nursing Facility      [    ] Short Term  [   x ] Long Term  [    ] Stairs                           [    ] Elevator     FUNCTIONAL STATUS PTA: (Check all that apply)  Ambulation: [     ]Independent    [    ] Dependent     [  x  ] Non-Ambulatory  Assistive Device: [    ] SA Cane  [    ]  Q Cane  [    ] Walker  [   x ]  Wheelchair  ADL : [    ] Independent  [   x ]  Dependent       Vital Signs Last 24 Hrs  T(C): 37.3 (12 Jun 2018 07:40), Max: 37.4 (11 Jun 2018 23:56)  T(F): 99.1 (12 Jun 2018 07:40), Max: 99.4 (11 Jun 2018 23:56)  HR: 97 (12 Jun 2018 07:40) (60 - 101)  BP: 105/55 (12 Jun 2018 07:40) (85/49 - 120/69)  BP(mean): 74 (11 Jun 2018 16:50) (74 - 84)  RR: 18 (12 Jun 2018 07:40) (10 - 21)  SpO2: 97% (11 Jun 2018 16:50) (96% - 100%)      PHYSICAL EXAM: Alert & Oriented X1  GENERAL: NAD, well-groomed, well-developed  HEAD:  Atraumatic, Normocephalic  EYES: EOMI, PERRLA, conjunctiva and sclera clear  NECK: Supple, No JVD, Normal thyroid  CHEST/LUNG: Clear to percussion bilaterally; No rales, rhonchi, wheezing, or rubs  HEART: Regular rate and rhythm; No murmurs, rubs, or gallops  ABDOMEN: Soft, Nontender, Nondistended; Bowel sounds present  EXTREMITIES:  R hip bandaged no calf tenderness    NERVOUS SYSTEM:  Cranial Nerves 2-12 intact [ x   ] Abnormal  [    ]  ROM: WFL all extremities [    ]  Abnormal [   x  ] R hip not tested  Motor Strength: WFL all extremities  [    ]  Abnormal [  x  ]R hip 3-5  Sensation: intact to light touch [   x ] Abnormal [    ]      FUNCTIONAL STATUS:  Bed Mobility: [   ]  Independent [    ]  Supervision [ x   ]  Needs Assistance [  ]  N/A  Transfers: [    ]  Independent [    ]  Supervision [ x  ]  Needs Assistance [    ]  N/A    Ambulation:  [    ]  Independent [    ]  Supervision [ x   ]  Needs Assistance [    ]  N/A   ADL:  [    ]   Independent [  x  ] Requires Assistance [    ] N/A       LABS:                        8.7    13.29 )-----------( 72       ( 12 Jun 2018 07:05 )             25.5     06-12    137  |  101  |  15  ----------------------------<  129<H>  4.5   |  26  |  0.9    Ca    8.1<L>      12 Jun 2018 07:05            RADIOLOGY & ADDITIONAL STUDIES:

## 2018-06-12 NOTE — CONSULT NOTE ADULT - ASSESSMENT
IMPRESSION: Rehab of R THR    PRECAUTIONS: [    ] Cardiac  [    ] Respiratory  [    ] Seizures [    ] Contact Isolation  [    ] Droplet Isolation  [    ] Other    Weight Bearing Status: WBAT    RECOMMENDATION:    Out of Bed to Chair     DVT/Decubiti Prophylaxis    REHAB PLAN:     [  x   ] Bedside P/T 3-5 times a week   [     ] Bedside O/T  2-3 times a week   [     ] No Rehab Therapy Indicated   [     ]  Speech Therapy   Conditioning/ROM                                 ADL  Bed Mobility                                            Conditioning/ROM  Transfers                                                  Bed Mobility  Sitting /Standing Balance                      Transfers                                        Gait Training                                            Sitting/Standing Balance  Stair Training [   ]Applicable                 Home equipment Eval                                                                     Splinting  [   ] Only      GOALS:   ADL   [  x  ]   Independent         Transfers  [   x ] Independent            Ambulation  [    xx ] Independent     [     ] With device                            [    ]  CG                                               [    ]  CG                                                    [     ] CG                            [    ] Min A                                          [    ] Min A                                                [     ] Min  A          DISCHARGE PLAN:   [     ]  Good candidate for Intensive Rehabilitation/Hospital based-4A [facility]                                             Will tolerate 3hrs Intensive Rehab Daily                                       [    x  ]  Rehab in Skilled Nursing Facility- LTC                                       [      ]  Home with Outpatient or VN services                                         [      ]  Possible Candidate for Intensive Hospital based Rehab

## 2018-06-12 NOTE — OCCUPATIONAL THERAPY INITIAL EVALUATION ADULT - RANGE OF MOTION EXAMINATION, UPPER EXTREMITY
Right UE Active ROM was WNL (within normal limits)/Left UE Active ROM was WNL (within normal limits)

## 2018-06-13 LAB
ANION GAP SERPL CALC-SCNC: 9 MMOL/L — SIGNIFICANT CHANGE UP (ref 7–14)
BUN SERPL-MCNC: 16 MG/DL — SIGNIFICANT CHANGE UP (ref 10–20)
CALCIUM SERPL-MCNC: 7.7 MG/DL — LOW (ref 8.5–10.1)
CHLORIDE SERPL-SCNC: 104 MMOL/L — SIGNIFICANT CHANGE UP (ref 98–110)
CO2 SERPL-SCNC: 25 MMOL/L — SIGNIFICANT CHANGE UP (ref 17–32)
CREAT SERPL-MCNC: 0.9 MG/DL — SIGNIFICANT CHANGE UP (ref 0.7–1.5)
GLUCOSE SERPL-MCNC: 111 MG/DL — HIGH (ref 70–99)
HCT VFR BLD CALC: 21.9 % — LOW (ref 42–52)
HGB BLD-MCNC: 7.5 G/DL — LOW (ref 14–18)
MCHC RBC-ENTMCNC: 31.6 PG — HIGH (ref 27–31)
MCHC RBC-ENTMCNC: 34.2 G/DL — SIGNIFICANT CHANGE UP (ref 32–37)
MCV RBC AUTO: 92.4 FL — SIGNIFICANT CHANGE UP (ref 80–94)
NRBC # BLD: 0 /100 WBCS — SIGNIFICANT CHANGE UP (ref 0–0)
PLATELET # BLD AUTO: 52 K/UL — LOW (ref 130–400)
POTASSIUM SERPL-MCNC: 4.4 MMOL/L — SIGNIFICANT CHANGE UP (ref 3.5–5)
POTASSIUM SERPL-SCNC: 4.4 MMOL/L — SIGNIFICANT CHANGE UP (ref 3.5–5)
RBC # BLD: 2.37 M/UL — LOW (ref 4.7–6.1)
RBC # FLD: 14.4 % — SIGNIFICANT CHANGE UP (ref 11.5–14.5)
SODIUM SERPL-SCNC: 138 MMOL/L — SIGNIFICANT CHANGE UP (ref 135–146)
SURGICAL PATHOLOGY STUDY: SIGNIFICANT CHANGE UP
WBC # BLD: 8.77 K/UL — SIGNIFICANT CHANGE UP (ref 4.8–10.8)
WBC # FLD AUTO: 8.77 K/UL — SIGNIFICANT CHANGE UP (ref 4.8–10.8)

## 2018-06-13 RX ADMIN — Medication 100 MILLIGRAM(S): at 18:17

## 2018-06-13 RX ADMIN — Medication 50 MILLIGRAM(S): at 12:37

## 2018-06-13 RX ADMIN — Medication 100 MILLIGRAM(S): at 21:14

## 2018-06-13 RX ADMIN — Medication 650 MILLIGRAM(S): at 23:17

## 2018-06-13 RX ADMIN — Medication 100 MILLIGRAM(S): at 05:18

## 2018-06-13 RX ADMIN — PANTOPRAZOLE SODIUM 40 MILLIGRAM(S): 20 TABLET, DELAYED RELEASE ORAL at 12:37

## 2018-06-13 NOTE — PROGRESS NOTE ADULT - SUBJECTIVE AND OBJECTIVE BOX
ORTHO PROGRESS NOTE       57yMale POD # 2     S/P right YISEL      Patient seen and examined at bedside . The patient is awake and alert in NAD. No complaints of chest pain, SOB, N/V.    Vital Signs Last 24 Hrs  T(C): 35.8 (13 Jun 2018 07:25), Max: 38.6 (12 Jun 2018 23:52)  T(F): 96.4 (13 Jun 2018 07:25), Max: 101.5 (12 Jun 2018 23:52)  HR: 964 (13 Jun 2018 07:25) (79 - 964)  BP: 101/52 (13 Jun 2018 07:25) (101/52 - 115/55)  BP(mean): --  RR: 18 (13 Jun 2018 07:25) (18 - 18)  SpO2: --                          7.5    8.77  )-----------( 52       ( 13 Jun 2018 06:13 )             21.9     06-13    138  |  104  |  16  ----------------------------<  111<H>  4.4   |  25  |  0.9    Ca    7.7<L>      13 Jun 2018 06:13            DVT ppx : lovenox     MEDICATIONS  (STANDING):  docusate sodium 100 milliGRAM(s) Oral three times a day  enalapril 20 milliGRAM(s) Oral daily  enoxaparin Injectable 40 milliGRAM(s) SubCutaneous every 24 hours  labetalol 100 milliGRAM(s) Oral two times a day  lactulose Syrup 30 Gram(s) Oral daily  pantoprazole    Tablet 40 milliGRAM(s) Oral daily  polyethylene glycol 3350 17 Gram(s) Oral daily  potassium chloride    Tablet ER 20 milliEquivalent(s) Oral two times a day  rifaximin 550 milliGRAM(s) Oral two times a day  traZODone 50 milliGRAM(s) Oral daily    MEDICATIONS  (PRN):  acetaminophen   Tablet 650 milliGRAM(s) Oral every 6 hours PRN For Temp over 38.3 C (100.94 F)  aluminum hydroxide/magnesium hydroxide/simethicone Suspension 30 milliLiter(s) Oral four times a day PRN Indigestion  ammonium lactate 12% Lotion 1 Application(s) Topical two times a day PRN Wound Care  bisacodyl Suppository 10 milliGRAM(s) Rectal daily PRN If no bowel movement by POD#2  diphenhydrAMINE   Capsule 25 milliGRAM(s) Oral at bedtime PRN Insomnia  magnesium hydroxide Suspension 30 milliLiter(s) Oral daily PRN Constipation  morphine  - Injectable 2 milliGRAM(s) IV Push every 3 hours PRN Severe Pain  ondansetron Injectable 4 milliGRAM(s) IV Push every 6 hours PRN Nausea and/or Vomiting  oxyCODONE    IR 5 milliGRAM(s) Oral every 4 hours PRN Mild Pain  oxyCODONE    IR 10 milliGRAM(s) Oral every 4 hours PRN Moderate Pain  senna 2 Tablet(s) Oral at bedtime PRN Constipation      PE:   right hip with serosanguinous drainage, hemovac removed , dsd applied           Compartments soft         NVI, SILT           A/P: 57yMale POD #  2  S/P  right YISEL           monitor h/h           OOB to Chair            Physical Therapy           Pain control            Incentive Spirometry            DVT Prophylaxis            Discharge planning

## 2018-06-14 LAB
BLD GP AB SCN SERPL QL: SIGNIFICANT CHANGE UP
HCT VFR BLD CALC: 20.7 % — LOW (ref 42–52)
HGB BLD-MCNC: 7.1 G/DL — CRITICAL LOW (ref 14–18)
MCHC RBC-ENTMCNC: 31.8 PG — HIGH (ref 27–31)
MCHC RBC-ENTMCNC: 34.3 G/DL — SIGNIFICANT CHANGE UP (ref 32–37)
MCV RBC AUTO: 92.8 FL — SIGNIFICANT CHANGE UP (ref 80–94)
NRBC # BLD: 0 /100 WBCS — SIGNIFICANT CHANGE UP (ref 0–0)
PLATELET # BLD AUTO: 49 K/UL — LOW (ref 130–400)
RBC # BLD: 2.23 M/UL — LOW (ref 4.7–6.1)
RBC # FLD: 14.3 % — SIGNIFICANT CHANGE UP (ref 11.5–14.5)
TYPE + AB SCN PNL BLD: SIGNIFICANT CHANGE UP
WBC # BLD: 5.67 K/UL — SIGNIFICANT CHANGE UP (ref 4.8–10.8)
WBC # FLD AUTO: 5.67 K/UL — SIGNIFICANT CHANGE UP (ref 4.8–10.8)

## 2018-06-14 RX ADMIN — PANTOPRAZOLE SODIUM 40 MILLIGRAM(S): 20 TABLET, DELAYED RELEASE ORAL at 11:02

## 2018-06-14 RX ADMIN — ENOXAPARIN SODIUM 40 MILLIGRAM(S): 100 INJECTION SUBCUTANEOUS at 23:47

## 2018-06-14 RX ADMIN — Medication 100 MILLIGRAM(S): at 05:21

## 2018-06-14 RX ADMIN — Medication 50 MILLIGRAM(S): at 11:02

## 2018-06-14 RX ADMIN — Medication 20 MILLIGRAM(S): at 05:22

## 2018-06-14 RX ADMIN — LACTULOSE 30 GRAM(S): 10 SOLUTION ORAL at 11:02

## 2018-06-14 RX ADMIN — ENOXAPARIN SODIUM 40 MILLIGRAM(S): 100 INJECTION SUBCUTANEOUS at 00:01

## 2018-06-14 RX ADMIN — MORPHINE SULFATE 2 MILLIGRAM(S): 50 CAPSULE, EXTENDED RELEASE ORAL at 23:47

## 2018-06-14 RX ADMIN — POLYETHYLENE GLYCOL 3350 17 GRAM(S): 17 POWDER, FOR SOLUTION ORAL at 11:02

## 2018-06-14 RX ADMIN — Medication 650 MILLIGRAM(S): at 13:37

## 2018-06-14 RX ADMIN — Medication 100 MILLIGRAM(S): at 17:54

## 2018-06-14 NOTE — PROGRESS NOTE ADULT - SUBJECTIVE AND OBJECTIVE BOX
56 y/o male s/p Right THR (6/11/2018) POD#3. Denies any chest pain, SOB, palpitations, dizziness, or any other c/o. Reports pain (5/10) controlled by pain meds. Tolerates PT well    Vital Signs Last 24 Hrs  T(C): 36.8 (14 Jun 2018 01:16), Max: 38.4 (13 Jun 2018 23:34)  T(F): 98.2 (14 Jun 2018 01:16), Max: 101.2 (13 Jun 2018 23:34)  HR: 82 (14 Jun 2018 05:17) (82 - 93)  BP: 107/51 (14 Jun 2018 05:17) (105/60 - 111/56)  BP(mean): --  RR: 18 (13 Jun 2018 23:34) (18 - 18)  SpO2: --    GEN: WN/WD, A&O X 3, NAD;  Right hip and LE:  Dressing saturated with serosanguinous drainage changed, incision is clean and dry, staples intact, decreased ROM secondary to pain, NVI, no decreased sensation, dorsi/plantar flexes ankle, no calf tenderness, DP pulse 2+.                7.5    8.77  )-----------( 52       ( 13 Jun 2018 06:13 )             21.9     138  |  104  |  16  ----------------------------<  111<H>  4.4   |  25  |  0.9    Ca    7.7<L>      13 Jun 2018 06:13    s/p Right THR POD#3:   - continue current Tx and PT;  - DVT prophylaxis;  - pain management;  - WBAT, hip precautions; fall precautions;

## 2018-06-15 ENCOUNTER — TRANSCRIPTION ENCOUNTER (OUTPATIENT)
Age: 58
End: 2018-06-15

## 2018-06-15 VITALS
SYSTOLIC BLOOD PRESSURE: 113 MMHG | TEMPERATURE: 99 F | HEART RATE: 77 BPM | DIASTOLIC BLOOD PRESSURE: 58 MMHG | RESPIRATION RATE: 18 BRPM

## 2018-06-15 LAB
HCT VFR BLD CALC: 24.1 % — LOW (ref 42–52)
HGB BLD-MCNC: 8.4 G/DL — LOW (ref 14–18)
MCHC RBC-ENTMCNC: 29.8 PG — SIGNIFICANT CHANGE UP (ref 27–31)
MCHC RBC-ENTMCNC: 34.9 G/DL — SIGNIFICANT CHANGE UP (ref 32–37)
MCV RBC AUTO: 85.5 FL — SIGNIFICANT CHANGE UP (ref 80–94)
NRBC # BLD: 0 /100 WBCS — SIGNIFICANT CHANGE UP (ref 0–0)
PLATELET # BLD AUTO: 78 K/UL — LOW (ref 130–400)
RBC # BLD: 2.82 M/UL — LOW (ref 4.7–6.1)
RBC # FLD: 21.1 % — HIGH (ref 11.5–14.5)
WBC # BLD: 5.27 K/UL — SIGNIFICANT CHANGE UP (ref 4.8–10.8)
WBC # FLD AUTO: 5.27 K/UL — SIGNIFICANT CHANGE UP (ref 4.8–10.8)

## 2018-06-15 RX ORDER — POLYETHYLENE GLYCOL 3350 17 G/17G
17 POWDER, FOR SOLUTION ORAL
Qty: 0 | Refills: 0 | DISCHARGE
Start: 2018-06-15

## 2018-06-15 RX ORDER — ENOXAPARIN SODIUM 100 MG/ML
0 INJECTION SUBCUTANEOUS
Qty: 0 | Refills: 0 | COMMUNITY
Start: 2018-06-15

## 2018-06-15 RX ORDER — PANTOPRAZOLE SODIUM 20 MG/1
1 TABLET, DELAYED RELEASE ORAL
Qty: 0 | Refills: 0 | DISCHARGE
Start: 2018-06-15

## 2018-06-15 RX ORDER — OXYCODONE HYDROCHLORIDE 5 MG/1
1 TABLET ORAL
Qty: 0 | Refills: 0 | DISCHARGE
Start: 2018-06-15

## 2018-06-15 RX ORDER — DOCUSATE SODIUM 100 MG
1 CAPSULE ORAL
Qty: 0 | Refills: 0 | DISCHARGE
Start: 2018-06-15

## 2018-06-15 RX ORDER — SENNA PLUS 8.6 MG/1
2 TABLET ORAL
Qty: 0 | Refills: 0 | DISCHARGE
Start: 2018-06-15

## 2018-06-15 RX ADMIN — Medication 20 MILLIGRAM(S): at 06:04

## 2018-06-15 RX ADMIN — Medication 100 MILLIGRAM(S): at 06:04

## 2018-06-15 RX ADMIN — PANTOPRAZOLE SODIUM 40 MILLIGRAM(S): 20 TABLET, DELAYED RELEASE ORAL at 12:51

## 2018-06-15 NOTE — DISCHARGE NOTE ADULT - PATIENT PORTAL LINK FT
You can access the [facility] Patient Portal, offered by [facility], by registering with the following website: [email or web address]

## 2018-06-15 NOTE — DISCHARGE NOTE ADULT - CAREGIVER RELATION TO PATIENT
History  Chief Complaint   Patient presents with    URI     Pt reports URI symptoms and states it feels the same as her pneumonia last year  pt alos reaport diarrhea for several days and some abd pain    Diarrhea     HPI    Prior to Admission Medications   Prescriptions Last Dose Informant Patient Reported? Taking? apixaban (ELIQUIS) 5 mg Past Week at Unknown time  Yes Yes   Sig: Take 5 mg by mouth 2 (two) times a day   aspirin (ECOTRIN LOW STRENGTH) 81 mg EC tablet Past Week at Unknown time  Yes Yes   Sig: Take 81 mg by mouth daily   atorvastatin (LIPITOR) 10 mg tablet Past Week at Unknown time  Yes Yes   Sig: Take 40 mg by mouth daily     carvedilol (COREG) 12 5 mg tablet Past Week at Unknown time  Yes Yes   Sig: Take 12 5 mg by mouth 2 (two) times a day with meals   lisinopril (ZESTRIL) 10 mg tablet Past Week at Unknown time  Yes Yes   Sig: Take 10 mg by mouth daily   metFORMIN (GLUCOPHAGE-XR) 500 mg 24 hr tablet 12/11/2017 at Unknown time  Yes Yes   Sig: Take by mouth 2 (two) times a day with meals   nitroglycerin (NITROSTAT) 0 4 mg SL tablet More than a month at Unknown time  Yes No   Sig: Place 0 4 mg under the tongue every 5 (five) minutes as needed for chest pain   omeprazole (PriLOSEC) 40 MG capsule Past Week at Unknown time  Yes Yes   Sig: Take 40 mg by mouth daily      Facility-Administered Medications: None       Past Medical History:   Diagnosis Date    Cardiac disease     Diabetes mellitus ([facility] [address] [facility] )     Hypertension     Pulmonary embolism ([facility] [address] )     Stomach ulcer        History reviewed  No pertinent surgical history  History reviewed  No pertinent family history  I have reviewed and agree with the history as documented      Social History   Substance Use Topics    Smoking status: Never Smoker    Smokeless tobacco: Never Used    Alcohol use No      Comment: rarely        Review of Systems    Physical Exam  ED Triage Vitals [12/12/17 1153]   Temperature Pulse Respirations Blood Pressure SpO2 Britany Jeter ) 97 4 °F (36 3 °C) (!) 106 20 160/84 99 %      Temp Source Heart Rate Source Patient Position - Orthostatic VS BP Location FiO2 (%)   Oral Monitor Sitting Left arm --      Pain Score       6           Orthostatic Vital Signs  Vitals:    12/12/17 1153 12/12/17 1315 12/12/17 1500   BP: 160/84 140/84 130/81   Pulse: (!) 106 96 94   Patient Position - Orthostatic VS: Sitting Lying        Physical Exam    ED Medications  Medications   acetaminophen (TYLENOL) tablet 650 mg (650 mg Oral Given 12/12/17 1451)   ibuprofen (MOTRIN) tablet 400 mg (400 mg Oral Given 12/12/17 1451)   iohexol (OMNIPAQUE) 350 MG/ML injection (MULTI-DOSE) 100 mL (100 mL Intravenous Given 12/12/17 1438)       Diagnostic Studies  Results Reviewed     Procedure Component Value Units Date/Time    POCT urinalysis dipstick [[id number]]  (Abnormal) Resulted:  12/12/17 1423    Lab Status:  Final result Specimen:  Urine Updated:  12/12/17 1423    POCT pregnancy, urine [[id number]]  (Normal) Resulted:  12/12/17 1423    Lab Status:  Final result Updated:  12/12/17 1423     EXT PREG TEST UR (Ref: Negative) Negative    Comprehensive metabolic panel [[id number]]  (Abnormal) Collected:  12/12/17 1323    Lab Status:  Final result Specimen:  Blood from Arm, Left Updated:  12/12/17 1408     Sodium 135 (L) mmol/L      Potassium 4 0 mmol/L      Chloride 102 mmol/L      CO2 26 mmol/L      Anion Gap 7 mmol/L      BUN 13 mg/dL      Creatinine 0 94 mg/dL      Glucose 242 (H) mg/dL      Calcium 9 1 mg/dL      AST 18 U/L      ALT 24 U/L      Alkaline Phosphatase 63 U/L      Total Protein 7 7 g/dL      Albumin 3 2 (L) g/dL      Total Bilirubin 0 85 mg/dL      eGFR 85 ml/min/1 73sq m     Narrative:         National Kidney Disease Education Program recommendations are as follows:  GFR calculation is accurate only with a steady state creatinine  Chronic Kidney disease less than 60 ml/min/1 73 sq  meters  Kidney failure less than 15 ml/min/1 73 sq  meters      Urine Microscopic [[id number]]  (Abnormal) Collected:  12/12/17 1332    Lab Status:  Final result Specimen:  Urine from Urine, Clean Catch Updated:  12/12/17 1349     RBC, UA None Seen /hpf      WBC, UA 10-20 (A) /hpf      Epithelial Cells Moderate (A) /hpf      Bacteria, UA Moderate (A) /hpf      Hyaline Casts, UA 3-5 (A) /lpf     Urine culture [[id number]] Collected:  12/12/17 1332    Lab Status: In process Specimen:  Urine from Urine, Clean Catch Updated:  12/12/17 1349    POCT troponin [[id number]]  (Normal) Collected:  12/12/17 1328    Lab Status:  Final result Updated:  12/12/17 1343     POC Troponin I 0 00 ng/ml      Specimen Type VENOUS    Narrative:         [employer] i-Stat handheld analyzer 99% cutoff is > 0 08ng/mL in network Emergency Departments    o cTnI 99% cutoff is useful only when applied to patients in the clinical setting of myocardial ischemia  o cTnI 99% cutoff should be interpreted in the context of clinical history, ECG findings and possibly cardiac imaging to establish correct diagnosis  o cTnI 99% cutoff may be suggestive but clearly not indicative of a coronary event without the clinical setting of myocardial ischemia      CBC and differential [[id number]]  (Abnormal) Collected:  12/12/17 1323    Lab Status:  Final result Specimen:  Blood from Arm, Left Updated:  12/12/17 1340     WBC 6 02 Thousand/uL      RBC 5 01 Million/uL      Hemoglobin 12 2 g/dL      Hematocrit 35 0 %      MCV 70 (L) fL      MCH 24 4 (L) pg      MCHC 34 9 g/dL      RDW 13 9 %      MPV 9 8 fL      Platelets 658 Thousands/uL      nRBC 0 /100 WBCs      Neutrophils Relative 66 %      Lymphocytes Relative 28 %      Monocytes Relative 5 %      Eosinophils Relative 1 %      Basophils Relative 0 %      Neutrophils Absolute 3 95 Thousands/µL      Lymphocytes Absolute 1 70 Thousands/µL      Monocytes Absolute 0 27 Thousand/µL      Eosinophils Absolute 0 08 Thousand/µL      Basophils Absolute 0 01 Thousands/µL     Legionella antigen, urine [[id number]] Collected:  12/12/17 1334    Lab Status: In process Specimen:  Urine from Urine, Clean Catch Updated:  12/12/17 1339    ED Urine Macroscopic [[id number]]  (Abnormal) Collected:  12/12/17 1332    Lab Status:  Final result Specimen:  Urine Updated:  12/12/17 1332     Color, UA Yellow     Clarity, UA Clear     pH, UA 5 5     Leukocytes, UA Negative     Nitrite, UA Negative     Protein, UA >=300 (A) mg/dl      Glucose,  (1/4%) (A) mg/dl      Ketones, UA Negative mg/dl      Urobilinogen, UA 0 2 E U /dl      Bilirubin, UA Negative     Blood, UA Trace (A)     Specific Gravity, UA >=1 030    Narrative:       CLINITEK RESULT                 XR chest 2 views   Final Result by JACKIE Bhatti MD (12/12 6124)      No acute pulmonary disease  No pneumothorax, post pacemaker/defibrillator  Workstation performed: [id number]         CT abdomen pelvis with contrast   Final Result by Carolyn Riley MD (12/12 1011)      No acute inflammatory stranding   No evidence of bowel obstruction  Rounded hypodensities seen within the both kidneys likely cysts  However some of these appear to demonstrate pseudoenhancement   Would suggest ultrasound of the kidneys for definite characterization  No solid lesion seen within the kidneys      Umbilicus hernia containing fat      Prominent and mildly enlarged right ovary with the 3 x 2 9 x 2 9 cm cyst    This can be evaluated with ultrasound performed on nonemergent basis        Immediate result and recommendation created in Epic         Workstation performed: [id number]               Procedures  ECG 12 Lead Documentation  Date/Time: 12/12/2017 1:09 PM  Performed by: Dagoberto García  Authorized by: Isael Wilcox     Indications / Diagnosis:  Cough  ECG reviewed by me, the ED Provider: yes    Patient location:  ED  Previous ECG:     Previous ECG:  Compared to current    Comparison to cardiac monitor: Yes    Interpretation:     Interpretation: normal    Rate:     ECG rate:  97    ECG rate assessment: normal    Rhythm:     Rhythm: sinus rhythm    Ectopy:     Ectopy: none    QRS:     QRS axis:  Normal  Conduction:     Conduction: normal    ST segments:     ST segments:  Normal  T waves:     T waves: normal              Phone Consults  ED Phone Contact    ED Course  ED Course                                [facility]  CritCare Time    Disposition  Final diagnoses:   Cough   Diarrhea     Time reflects when diagnosis was documented in both MDM as applicable and the Disposition within this note     Time User Action Codes Description Comment    12/12/2017  3:48 PM Lelo Polite Add [J06 9] URI (upper respiratory infection)     12/12/2017  3:48 PM Lelo Polite Remove [J06 9] URI (upper respiratory infection)     12/12/2017  3:48 PM Lelo Polite Add [R05] Cough     12/12/2017  3:48 PM [facility] [R19 7] Diarrhea     12/12/2017  3:48 PM Lelo Polite Modify [R05] Cough     12/12/2017  3:48 PM Lelo Polite Modify [R19 7] Diarrhea       ED Disposition     None      Follow-up Information     Follow up With Specialties Details Why Contact Info Additional Information    Willie Reardon  Schedule an appointment as soon as possible for a visit If symptoms worsen, As needed [address] Emergency Department Emergency Medicine Go to As needed, If symptoms worsen [address] ED, [address], [address]        Patient's Medications   Discharge Prescriptions    No medications on file     No discharge procedures on file  ED Provider  Attending physically available and evaluated Angie Aj I managed the patient along with the ED Attending      Electronically Signed by         Arben Rogers MD  Resident  12/12/17 0425 transport

## 2018-06-15 NOTE — DISCHARGE NOTE ADULT - MEDICATION SUMMARY - MEDICATIONS TO TAKE
I will START or STAY ON the medications listed below when I get home from the hospital:    acetaminophen 325 mg oral capsule  -- 2 cap(s) by mouth every 6 hours, As Needed  -- Indication: For PO    oxyCODONE 5 mg oral tablet  -- 1 tab(s) by mouth every 4 hours, As needed, Mild Pain  -- Indication: For PO    enalapril 20 mg oral tablet  -- 1 tab(s) by mouth once a day  -- Indication: For HM    aluminum hydroxide-magnesium hydroxide 200 mg-200 mg/5 mL oral suspension  -- 30 milliliter(s) by mouth 4 times a day, As needed, Indigestion  -- Indication: For PO    enoxaparin  -- Indication: For PO    traZODone 50 mg oral tablet  -- 1 tab(s) by mouth once a day  -- Indication: For PO    labetalol 100 mg oral tablet  -- 1 tab(s) by mouth 2 times a day  -- Indication: For HM    ammonium lactate 12% topical cream  -- Apply on skin to affected area once a day  -- Indication: For PO    lactulose 10 g/15 mL oral syrup  -- 30 milliliter(s) by mouth once a day  -- Indication: For PO    bisacodyl 10 mg rectal suppository  -- 1 suppository(ies) rectally once a day, As needed, If no bowel movement by POD#2  -- Indication: For PO    docusate sodium 100 mg oral capsule  -- 1 cap(s) by mouth 3 times a day  -- Indication: For PO    polyethylene glycol 3350 oral powder for reconstitution  -- 17 gram(s) by mouth once a day  -- Indication: For PO    senna oral tablet  -- 2 tab(s) by mouth once a day (at bedtime), As needed, Constipation  -- Indication: For PO    potassium chloride 10 mEq oral capsule, extended release  -- 1 cap(s) by mouth 2 times a day  -- Indication: For HM    Xifaxan 550 mg oral tablet  -- 1 tab(s) by mouth 2 times a day  -- Indication: For HM    pantoprazole 40 mg oral delayed release tablet  -- 1 tab(s) by mouth once a day  -- Indication: For HM

## 2018-06-15 NOTE — PROGRESS NOTE ADULT - SUBJECTIVE AND OBJECTIVE BOX
ORTHO PROGRESS NOTE       57yMale POD # 4     S/P right YISEL    Patient seen and examined at bedside . The patient is awake and alert in NAD. No complaints of chest pain, SOB, N/V.    Vital Signs Last 24 Hrs  T(C): 37.3 (15 Schuyler 2018 08:00), Max: 37.8 (14 Jun 2018 23:42)  T(F): 99.1 (15 Schuyler 2018 08:00), Max: 100 (14 Jun 2018 23:42)  HR: 77 (15 Schuyler 2018 08:00) (77 - 83)  BP: 113/58 (15 Schuyler 2018 08:00) (113/58 - 121/64)  BP(mean): --  RR: 18 (15 Schuyler 2018 08:00) (18 - 18)  SpO2: 97% (14 Jun 2018 23:42) (97% - 97%)                          8.4    5.27  )-----------( 78       ( 15 Schuyler 2018 06:23 )             24.1                 DVT ppx :enoxaparin     MEDICATIONS  (STANDING):  docusate sodium 100 milliGRAM(s) Oral three times a day  enalapril 20 milliGRAM(s) Oral daily  enoxaparin Injectable 40 milliGRAM(s) SubCutaneous every 24 hours  labetalol 100 milliGRAM(s) Oral two times a day  lactulose Syrup 30 Gram(s) Oral daily  pantoprazole    Tablet 40 milliGRAM(s) Oral daily  polyethylene glycol 3350 17 Gram(s) Oral daily  potassium chloride    Tablet ER 20 milliEquivalent(s) Oral two times a day  rifaximin 550 milliGRAM(s) Oral two times a day  traZODone 50 milliGRAM(s) Oral daily    MEDICATIONS  (PRN):  acetaminophen   Tablet 650 milliGRAM(s) Oral every 6 hours PRN For Temp over 38.3 C (100.94 F)  aluminum hydroxide/magnesium hydroxide/simethicone Suspension 30 milliLiter(s) Oral four times a day PRN Indigestion  ammonium lactate 12% Lotion 1 Application(s) Topical two times a day PRN Wound Care  bisacodyl Suppository 10 milliGRAM(s) Rectal daily PRN If no bowel movement by POD#2  diphenhydrAMINE   Capsule 25 milliGRAM(s) Oral at bedtime PRN Insomnia  magnesium hydroxide Suspension 30 milliLiter(s) Oral daily PRN Constipation  morphine  - Injectable 2 milliGRAM(s) IV Push every 3 hours PRN Severe Pain  ondansetron Injectable 4 milliGRAM(s) IV Push every 6 hours PRN Nausea and/or Vomiting  oxyCODONE    IR 5 milliGRAM(s) Oral every 4 hours PRN Mild Pain  oxyCODONE    IR 10 milliGRAM(s) Oral every 4 hours PRN Moderate Pain  senna 2 Tablet(s) Oral at bedtime PRN Constipation      PE:   right hip with decreasing serous drainage          Compartments soft         NVI, SILT           A/P: 57yMale POD # 4    S/P right YISEL drainage improving             OOB to Chair            Physical Therapy           Pain control            Incentive Spirometry            DVT Prophylaxis            Discharge planning ORTHO PROGRESS NOTE       57yMale POD # 4     S/P right YISEL    Patient seen and examined at bedside . The patient is awake and alert in NAD. No complaints of chest pain, SOB, N/V.    Vital Signs Last 24 Hrs  T(C): 37.3 (15 Schuyler 2018 08:00), Max: 37.8 (14 Jun 2018 23:42)  T(F): 99.1 (15 Schuyler 2018 08:00), Max: 100 (14 Jun 2018 23:42)  HR: 77 (15 Schuyler 2018 08:00) (77 - 83)  BP: 113/58 (15 Schuyler 2018 08:00) (113/58 - 121/64)  BP(mean): --  RR: 18 (15 Schuyler 2018 08:00) (18 - 18)  SpO2: 97% (14 Jun 2018 23:42) (97% - 97%)                          8.4    5.27  )-----------( 78       ( 15 Schuyler 2018 06:23 )             24.1           DVT ppx :enoxaparin     MEDICATIONS  (STANDING):  docusate sodium 100 milliGRAM(s) Oral three times a day  enalapril 20 milliGRAM(s) Oral daily  enoxaparin Injectable 40 milliGRAM(s) SubCutaneous every 24 hours  labetalol 100 milliGRAM(s) Oral two times a day  lactulose Syrup 30 Gram(s) Oral daily  pantoprazole    Tablet 40 milliGRAM(s) Oral daily  polyethylene glycol 3350 17 Gram(s) Oral daily  potassium chloride    Tablet ER 20 milliEquivalent(s) Oral two times a day  rifaximin 550 milliGRAM(s) Oral two times a day  traZODone 50 milliGRAM(s) Oral daily    MEDICATIONS  (PRN):  acetaminophen   Tablet 650 milliGRAM(s) Oral every 6 hours PRN For Temp over 38.3 C (100.94 F)  aluminum hydroxide/magnesium hydroxide/simethicone Suspension 30 milliLiter(s) Oral four times a day PRN Indigestion  ammonium lactate 12% Lotion 1 Application(s) Topical two times a day PRN Wound Care  bisacodyl Suppository 10 milliGRAM(s) Rectal daily PRN If no bowel movement by POD#2  diphenhydrAMINE   Capsule 25 milliGRAM(s) Oral at bedtime PRN Insomnia  magnesium hydroxide Suspension 30 milliLiter(s) Oral daily PRN Constipation  morphine  - Injectable 2 milliGRAM(s) IV Push every 3 hours PRN Severe Pain  ondansetron Injectable 4 milliGRAM(s) IV Push every 6 hours PRN Nausea and/or Vomiting  oxyCODONE    IR 5 milliGRAM(s) Oral every 4 hours PRN Mild Pain  oxyCODONE    IR 10 milliGRAM(s) Oral every 4 hours PRN Moderate Pain  senna 2 Tablet(s) Oral at bedtime PRN Constipation      PE:   right hip with decreasing serous drainage          Compartments soft         NVI, SILT           A/P: 57yMale POD # 4    S/P right YISEL drainage improving             OOB to Chair            Physical Therapy           Pain control            Incentive Spirometry            DVT Prophylaxis            Discharge planning   acute blood loss anemia s/p transfusion of prbc w/ improved h/h

## 2018-06-15 NOTE — DISCHARGE NOTE ADULT - CARE PROVIDER_API CALL
Yoel Heller), Orthopaedic Surgery  [address] [place] [id number]  Phone: [phone]  Fax: [phone]

## 2018-06-15 NOTE — DISCHARGE NOTE ADULT - CARE PLAN
Principal Discharge DX:	Hip fracture due to osteoporosis with delayed healing, unspecified laterality, subsequent encounter  Goal:	YISEL WBAT  Assessment and plan of treatment:	YISEL A/P FN HIP FX

## 2018-06-16 ENCOUNTER — OUTPATIENT (OUTPATIENT)
Dept: OUTPATIENT SERVICES | Facility: HOSPITAL | Age: 58
LOS: 1 days | Discharge: HOME | End: 2018-06-16

## 2018-06-16 DIAGNOSIS — K82.9 DISEASE OF GALLBLADDER, UNSPECIFIED: Chronic | ICD-10-CM

## 2018-06-16 DIAGNOSIS — J44.9 CHRONIC OBSTRUCTIVE PULMONARY DISEASE, UNSPECIFIED: ICD-10-CM

## 2018-06-21 ENCOUNTER — OUTPATIENT (OUTPATIENT)
Dept: OUTPATIENT SERVICES | Facility: HOSPITAL | Age: 58
LOS: 1 days | Discharge: HOME | End: 2018-06-21

## 2018-06-21 DIAGNOSIS — N39.0 URINARY TRACT INFECTION, SITE NOT SPECIFIED: ICD-10-CM

## 2018-06-21 DIAGNOSIS — M16.11 UNILATERAL PRIMARY OSTEOARTHRITIS, RIGHT HIP: ICD-10-CM

## 2018-06-21 DIAGNOSIS — K82.9 DISEASE OF GALLBLADDER, UNSPECIFIED: Chronic | ICD-10-CM

## 2018-06-25 DIAGNOSIS — Z99.3 DEPENDENCE ON WHEELCHAIR: ICD-10-CM

## 2018-06-25 DIAGNOSIS — S72.001G FRACTURE OF UNSPECIFIED PART OF NECK OF RIGHT FEMUR, SUBSEQUENT ENCOUNTER FOR CLOSED FRACTURE WITH DELAYED HEALING: ICD-10-CM

## 2018-06-25 DIAGNOSIS — F41.9 ANXIETY DISORDER, UNSPECIFIED: ICD-10-CM

## 2018-06-25 DIAGNOSIS — M80.051A AGE-RELATED OSTEOPOROSIS WITH CURRENT PATHOLOGICAL FRACTURE, RIGHT FEMUR, INITIAL ENCOUNTER FOR FRACTURE: ICD-10-CM
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
## 2018-06-25 DIAGNOSIS — Q65.89 OTHER SPECIFIED CONGENITAL DEFORMITIES OF HIP: ICD-10-CM

## 2018-06-25 DIAGNOSIS — Z66 DO NOT RESUSCITATE: ICD-10-CM

## 2018-06-25 DIAGNOSIS — F25.1 SCHIZOAFFECTIVE DISORDER, DEPRESSIVE TYPE: ICD-10-CM

## 2018-06-25 DIAGNOSIS — F10.11 ALCOHOL ABUSE, IN REMISSION: ICD-10-CM

## 2018-06-25 DIAGNOSIS — D62 ACUTE POSTHEMORRHAGIC ANEMIA: ICD-10-CM

## 2018-06-25 DIAGNOSIS — R32 UNSPECIFIED URINARY INCONTINENCE: ICD-10-CM

## 2018-06-25 DIAGNOSIS — K59.00 CONSTIPATION, UNSPECIFIED: ICD-10-CM

## 2018-06-25 DIAGNOSIS — M54.31 SCIATICA, RIGHT SIDE: ICD-10-CM

## 2018-10-15 ENCOUNTER — OUTPATIENT (OUTPATIENT)
Dept: OUTPATIENT SERVICES | Facility: HOSPITAL | Age: 58
LOS: 1 days | Discharge: HOME | End: 2018-10-15

## 2018-10-15 DIAGNOSIS — K82.9 DISEASE OF GALLBLADDER, UNSPECIFIED: Chronic | ICD-10-CM

## 2018-10-15 PROBLEM — I10 ESSENTIAL (PRIMARY) HYPERTENSION: Chronic | Status: ACTIVE | Noted: 2018-06-05

## 2018-10-15 PROBLEM — F25.0 SCHIZOAFFECTIVE DISORDER, BIPOLAR TYPE: Chronic | Status: ACTIVE | Noted: 2018-06-05

## 2018-10-15 PROBLEM — F10.10 ALCOHOL ABUSE, UNCOMPLICATED: Chronic | Status: ACTIVE | Noted: 2018-06-05

## 2018-10-15 PROBLEM — K59.00 CONSTIPATION, UNSPECIFIED: Chronic | Status: ACTIVE | Noted: 2018-06-05

## 2018-10-15 PROBLEM — M54.30 SCIATICA, UNSPECIFIED SIDE: Chronic | Status: ACTIVE | Noted: 2018-06-05

## 2018-10-30 ENCOUNTER — OUTPATIENT (OUTPATIENT)
Dept: OUTPATIENT SERVICES | Facility: HOSPITAL | Age: 58
LOS: 1 days | Discharge: HOME | End: 2018-10-30

## 2018-10-30 DIAGNOSIS — G93.40 ENCEPHALOPATHY, UNSPECIFIED: ICD-10-CM

## 2018-10-30 DIAGNOSIS — K82.9 DISEASE OF GALLBLADDER, UNSPECIFIED: Chronic | ICD-10-CM

## 2018-11-03 ENCOUNTER — OUTPATIENT (OUTPATIENT)
Dept: OUTPATIENT SERVICES | Facility: HOSPITAL | Age: 58
LOS: 1 days | Discharge: HOME | End: 2018-11-03

## 2018-11-03 DIAGNOSIS — K82.9 DISEASE OF GALLBLADDER, UNSPECIFIED: Chronic | ICD-10-CM

## 2018-11-03 DIAGNOSIS — I10 ESSENTIAL (PRIMARY) HYPERTENSION: ICD-10-CM

## 2018-11-06 ENCOUNTER — OUTPATIENT (OUTPATIENT)
Dept: OUTPATIENT SERVICES | Facility: HOSPITAL | Age: 58
LOS: 1 days | Discharge: HOME | End: 2018-11-06

## 2018-11-06 DIAGNOSIS — K82.9 DISEASE OF GALLBLADDER, UNSPECIFIED: Chronic | ICD-10-CM

## 2018-11-06 DIAGNOSIS — R52 PAIN, UNSPECIFIED: ICD-10-CM

## 2018-12-14 ENCOUNTER — LABORATORY RESULT (OUTPATIENT)
Age: 58
End: 2018-12-14

## 2018-12-14 ENCOUNTER — APPOINTMENT (OUTPATIENT)
Dept: HEMATOLOGY ONCOLOGY | Facility: CLINIC | Age: 58
End: 2018-12-14

## 2018-12-14 VITALS
SYSTOLIC BLOOD PRESSURE: 101 MMHG | WEIGHT: 171 LBS | DIASTOLIC BLOOD PRESSURE: 59 MMHG | TEMPERATURE: 97.4 F | HEIGHT: 71 IN | RESPIRATION RATE: 14 BRPM | BODY MASS INDEX: 23.94 KG/M2 | HEART RATE: 73 BPM

## 2018-12-14 DIAGNOSIS — Z87.891 PERSONAL HISTORY OF NICOTINE DEPENDENCE: ICD-10-CM

## 2018-12-14 LAB
ALBUMIN SERPL ELPH-MCNC: 4 G/DL
ALP BLD-CCNC: 105 U/L
ALT SERPL-CCNC: 12 U/L
ANION GAP SERPL CALC-SCNC: 14 MMOL/L
AST SERPL-CCNC: 15 U/L
BILIRUB DIRECT SERPL-MCNC: 0.4 MG/DL
BILIRUB INDIRECT SERPL-MCNC: 1.7 MG/DL
BILIRUB SERPL-MCNC: 2.1 MG/DL
BUN SERPL-MCNC: 12 MG/DL
CALCIUM SERPL-MCNC: 9.5 MG/DL
CHLORIDE SERPL-SCNC: 106 MMOL/L
CO2 SERPL-SCNC: 24 MMOL/L
CREAT SERPL-MCNC: 1 MG/DL
GLUCOSE SERPL-MCNC: 94 MG/DL
HCT VFR BLD CALC: 38 %
HGB BLD-MCNC: 12.8 G/DL
IRON SATN MFR SERPL: 49 %
IRON SERPL-MCNC: 166 UG/DL
MCHC RBC-ENTMCNC: 31.8 PG
MCHC RBC-ENTMCNC: 33.7 G/DL
MCV RBC AUTO: 94.5 FL
PLATELET # BLD AUTO: 91 K/UL
PMV BLD: 10.5 FL
POTASSIUM SERPL-SCNC: 4 MMOL/L
PROT SERPL-MCNC: 6.9 G/DL
RBC # BLD: 4.02 M/UL
RBC # FLD: 13.9 %
SODIUM SERPL-SCNC: 144 MMOL/L
TIBC SERPL-MCNC: 336 UG/DL
UIBC SERPL-MCNC: 170 UG/DL
WBC # FLD AUTO: 5.07 K/UL

## 2018-12-14 NOTE — REVIEW OF SYSTEMS
[Abdominal Pain] : no abdominal pain [Constipation] : constipation [Negative] : Allergic/Immunologic

## 2018-12-14 NOTE — ASSESSMENT
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

## 2018-12-17 LAB
FERRITIN SERPL-MCNC: 81 NG/ML
FOLATE RBC-MCNC: 1116 NG/ML
HBV CORE IGG+IGM SER QL: NONREACTIVE
HCT VFR BLD CALC: 39 %
HCV AB SER QL: NONREACTIVE
HCV S/CO RATIO: 0.09 S/CO
VIT B12 SERPL-MCNC: 297 PG/ML

## 2018-12-18 ENCOUNTER — OUTPATIENT (OUTPATIENT)
Dept: OUTPATIENT SERVICES | Facility: HOSPITAL | Age: 58
LOS: 1 days | Discharge: HOME | End: 2018-12-18

## 2018-12-18 DIAGNOSIS — B35.3 TINEA PEDIS: ICD-10-CM

## 2018-12-18 DIAGNOSIS — J44.9 CHRONIC OBSTRUCTIVE PULMONARY DISEASE, UNSPECIFIED: ICD-10-CM

## 2018-12-18 DIAGNOSIS — F32.0 MAJOR DEPRESSIVE DISORDER, SINGLE EPISODE, MILD: ICD-10-CM

## 2018-12-18 DIAGNOSIS — K21.9 GASTRO-ESOPHAGEAL REFLUX DISEASE WITHOUT ESOPHAGITIS: ICD-10-CM

## 2018-12-18 DIAGNOSIS — K82.9 DISEASE OF GALLBLADDER, UNSPECIFIED: Chronic | ICD-10-CM

## 2018-12-18 DIAGNOSIS — F06.4 ANXIETY DISORDER DUE TO KNOWN PHYSIOLOGICAL CONDITION: ICD-10-CM

## 2018-12-18 LAB
HBV E AB SER QL: NEGATIVE
HBV E AG SER QL: NEGATIVE

## 2019-01-04 ENCOUNTER — APPOINTMENT (OUTPATIENT)
Dept: HEMATOLOGY ONCOLOGY | Facility: CLINIC | Age: 59
End: 2019-01-04

## 2019-01-04 VITALS
WEIGHT: 173 LBS | DIASTOLIC BLOOD PRESSURE: 77 MMHG | TEMPERATURE: 98.1 F | SYSTOLIC BLOOD PRESSURE: 147 MMHG | HEART RATE: 66 BPM | BODY MASS INDEX: 24.22 KG/M2 | HEIGHT: 71 IN | RESPIRATION RATE: 14 BRPM

## 2019-01-04 DIAGNOSIS — Z87.19 PERSONAL HISTORY OF OTHER DISEASES OF THE DIGESTIVE SYSTEM: ICD-10-CM

## 2019-01-04 DIAGNOSIS — Z80.52 FAMILY HISTORY OF MALIGNANT NEOPLASM OF BLADDER: ICD-10-CM

## 2019-01-04 NOTE — REVIEW OF SYSTEMS
[Constipation] : constipation [Negative] : Allergic/Immunologic [Abdominal Pain] : no abdominal pain

## 2019-01-04 NOTE — HISTORY OF PRESENT ILLNESS
[de-identified] : 57 yo man with extensive history of ETOH use, hepatomegaly and splenomegaly on sono done in 2018; has anemia and mild thrombocytopenia since at least 2016; noted to have hepatic steatosis in 2016 on sono and ct scan; denies mucocutaneous bleeding\par \par

## 2019-01-04 NOTE — ASSESSMENT
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

## 2019-02-02 ENCOUNTER — OUTPATIENT (OUTPATIENT)
Dept: OUTPATIENT SERVICES | Facility: HOSPITAL | Age: 59
LOS: 1 days | Discharge: HOME | End: 2019-02-02

## 2019-02-02 DIAGNOSIS — K82.9 DISEASE OF GALLBLADDER, UNSPECIFIED: Chronic | ICD-10-CM

## 2019-02-02 DIAGNOSIS — K70.30 ALCOHOLIC CIRRHOSIS OF LIVER WITHOUT ASCITES: ICD-10-CM

## 2019-02-08 ENCOUNTER — OUTPATIENT (OUTPATIENT)
Dept: OUTPATIENT SERVICES | Facility: HOSPITAL | Age: 59
LOS: 1 days | Discharge: HOME | End: 2019-02-08

## 2019-02-08 ENCOUNTER — APPOINTMENT (OUTPATIENT)
Dept: HEMATOLOGY ONCOLOGY | Facility: CLINIC | Age: 59
End: 2019-02-08

## 2019-02-08 VITALS
BODY MASS INDEX: 23.8 KG/M2 | HEIGHT: 71 IN | SYSTOLIC BLOOD PRESSURE: 138 MMHG | DIASTOLIC BLOOD PRESSURE: 67 MMHG | HEART RATE: 65 BPM | TEMPERATURE: 98.1 F | RESPIRATION RATE: 14 BRPM | WEIGHT: 170 LBS

## 2019-02-08 DIAGNOSIS — K70.9 ALCOHOLIC LIVER DISEASE, UNSPECIFIED: ICD-10-CM

## 2019-02-08 DIAGNOSIS — K70.30 ALCOHOLIC CIRRHOSIS OF LIVER W/OUT ASCITES: ICD-10-CM

## 2019-02-08 DIAGNOSIS — K82.9 DISEASE OF GALLBLADDER, UNSPECIFIED: Chronic | ICD-10-CM

## 2019-02-08 DIAGNOSIS — D69.6 THROMBOCYTOPENIA, UNSPECIFIED: ICD-10-CM

## 2019-02-08 NOTE — ASSESSMENT
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

## 2019-02-08 NOTE — HISTORY OF PRESENT ILLNESS
[de-identified] : 59 yo man with extensive history of ETOH use, hepatomegaly and splenomegaly on sono done in 2018; has anemia and mild thrombocytopenia since at least 2016; noted to have hepatic steatosis in 2016 on sono and ct scan; denies mucocutaneous bleeding\par \par MRI abdomen revealed cirrhosis and portal hypertension\par \par

## 2019-02-13 ENCOUNTER — OUTPATIENT (OUTPATIENT)
Dept: OUTPATIENT SERVICES | Facility: HOSPITAL | Age: 59
LOS: 1 days | Discharge: HOME | End: 2019-02-13

## 2019-02-13 ENCOUNTER — RESULT REVIEW (OUTPATIENT)
Age: 59
End: 2019-02-13

## 2019-02-13 VITALS
HEART RATE: 59 BPM | WEIGHT: 171.08 LBS | HEIGHT: 71 IN | TEMPERATURE: 98 F | SYSTOLIC BLOOD PRESSURE: 137 MMHG | DIASTOLIC BLOOD PRESSURE: 84 MMHG | RESPIRATION RATE: 18 BRPM

## 2019-02-13 VITALS — DIASTOLIC BLOOD PRESSURE: 93 MMHG | RESPIRATION RATE: 18 BRPM | SYSTOLIC BLOOD PRESSURE: 180 MMHG | HEART RATE: 80 BPM

## 2019-02-13 DIAGNOSIS — K82.9 DISEASE OF GALLBLADDER, UNSPECIFIED: Chronic | ICD-10-CM

## 2019-02-13 NOTE — ASU DISCHARGE PLAN (ADULT/PEDIATRIC). - NOTIFY
Fever greater than 101/Pain not relieved by Medications/Inability to Tolerate Liquids or Foods/Persistent Nausea and Vomiting/Bleeding that does not stop/Swelling that continues/Numbness, tingling/Excessive Diarrhea/Unable to Urinate

## 2019-02-13 NOTE — ASU PATIENT PROFILE, ADULT - BLOOD AVOIDANCE/RESTRICTIONS, PROFILE
those caring for you know how to best care for your health needs at home. This section may also include educational information about certain health topics that may be of help to you. Important Information if you smoke or are exposed to smoking       SMOKING: QUIT SMOKING. THIS IS THE MOST IMPORTANT ACTION YOU CAN TAKE TO IMPROVE YOUR CURRENT AND FUTURE HEALTH. Call the [address] [place] [address] at [place] NOW ([phone])    Smoking harms nonsmokers. When nonsmokers are around people who smoke, they absorb nicotine, carbon monoxide, and other ingredients of tobacco smoke. DO NOT SMOKE AROUND CHILDREN     Children exposed to secondhand smoke are at an increased risk of:  Sudden Infant Death Syndrome (SIDS), acute respiratory infections, inflammation of the middle ear, and severe asthma. Over a longer time, it causes heart disease and lung cancer. There is no safe level of exposure to secondhand smoke. Important information for a smoker       SMOKING: QUIT SMOKING. THIS IS THE MOST IMPORTANT ACTION YOU CAN TAKE TO IMPROVE YOUR CURRENT AND FUTURE HEALTH. Call the [address] [place] [address] at [facility] ([phone])    Smoking harms nonsmokers. When nonsmokers are around people who smoke, they absorb nicotine, carbon monoxide, and other ingredients of tobacco smoke. DO NOT SMOKE AROUND CHILDREN     Children exposed to secondhand smoke are at an increased risk of:  Sudden Infant Death Syndrome (SIDS), acute respiratory infections, inflammation of the middle ear, and severe asthma. Over a longer time, it causes heart disease and lung cancer. There is no safe level of exposure to secondhand smoke. [employer] Signup     [employer] allows you to send messages to your doctor, view your test results, renew your prescriptions, schedule appointments, view visit notes, and more. How Do I Sign Up? 1.  In your Internet browser, go to none

## 2019-02-13 NOTE — ASU PATIENT PROFILE, ADULT - PRO ARRIVE FROM
skilled nursing facility/acute care facility/long term care facility/[facility]

## 2019-02-14 LAB — SURGICAL PATHOLOGY STUDY: SIGNIFICANT CHANGE UP

## 2019-02-18 DIAGNOSIS — K70.30 ALCOHOLIC CIRRHOSIS OF LIVER WITHOUT ASCITES: ICD-10-CM

## 2019-02-18 DIAGNOSIS — K28.9 GASTROJEJUNAL ULCER, UNSPECIFIED AS ACUTE OR CHRONIC, WITHOUT HEMORRHAGE OR PERFORATION: ICD-10-CM

## 2019-02-18 DIAGNOSIS — I85.10 SECONDARY ESOPHAGEAL VARICES WITHOUT BLEEDING: ICD-10-CM

## 2019-04-02 ENCOUNTER — OUTPATIENT (OUTPATIENT)
Dept: OUTPATIENT SERVICES | Facility: HOSPITAL | Age: 59
LOS: 1 days | Discharge: HOME | End: 2019-04-02

## 2019-04-02 DIAGNOSIS — K70.30 ALCOHOLIC CIRRHOSIS OF LIVER WITHOUT ASCITES: ICD-10-CM

## 2019-04-02 DIAGNOSIS — K82.9 DISEASE OF GALLBLADDER, UNSPECIFIED: Chronic | ICD-10-CM

## 2019-04-26 ENCOUNTER — OUTPATIENT (OUTPATIENT)
Dept: OUTPATIENT SERVICES | Facility: HOSPITAL | Age: 59
LOS: 1 days | Discharge: HOME | End: 2019-04-26

## 2019-04-26 DIAGNOSIS — K82.9 DISEASE OF GALLBLADDER, UNSPECIFIED: Chronic | ICD-10-CM

## 2019-04-26 DIAGNOSIS — D52.9 FOLATE DEFICIENCY ANEMIA, UNSPECIFIED: ICD-10-CM

## 2019-05-02 ENCOUNTER — OUTPATIENT (OUTPATIENT)
Dept: OUTPATIENT SERVICES | Facility: HOSPITAL | Age: 59
LOS: 1 days | Discharge: HOME | End: 2019-05-02

## 2019-05-02 DIAGNOSIS — K82.9 DISEASE OF GALLBLADDER, UNSPECIFIED: Chronic | ICD-10-CM

## 2019-05-02 DIAGNOSIS — F32.0 MAJOR DEPRESSIVE DISORDER, SINGLE EPISODE, MILD: ICD-10-CM

## 2019-05-03 ENCOUNTER — APPOINTMENT (OUTPATIENT)
Dept: HEMATOLOGY ONCOLOGY | Facility: CLINIC | Age: 59
End: 2019-05-03

## 2019-05-06 ENCOUNTER — OUTPATIENT (OUTPATIENT)
Dept: OUTPATIENT SERVICES | Facility: HOSPITAL | Age: 59
LOS: 1 days | Discharge: HOME | End: 2019-05-06

## 2019-05-06 DIAGNOSIS — K82.9 DISEASE OF GALLBLADDER, UNSPECIFIED: Chronic | ICD-10-CM

## 2019-05-06 DIAGNOSIS — I10 ESSENTIAL (PRIMARY) HYPERTENSION: ICD-10-CM

## 2019-05-10 ENCOUNTER — LABORATORY RESULT (OUTPATIENT)
Age: 59
End: 2019-05-10

## 2019-05-10 ENCOUNTER — APPOINTMENT (OUTPATIENT)
Dept: HEMATOLOGY ONCOLOGY | Facility: CLINIC | Age: 59
End: 2019-05-10

## 2019-05-10 ENCOUNTER — OUTPATIENT (OUTPATIENT)
Dept: OUTPATIENT SERVICES | Facility: HOSPITAL | Age: 59
LOS: 1 days | Discharge: HOME | End: 2019-05-10

## 2019-05-10 VITALS
TEMPERATURE: 97.3 F | SYSTOLIC BLOOD PRESSURE: 102 MMHG | HEIGHT: 71 IN | HEART RATE: 62 BPM | WEIGHT: 168 LBS | DIASTOLIC BLOOD PRESSURE: 69 MMHG | RESPIRATION RATE: 14 BRPM | BODY MASS INDEX: 23.52 KG/M2

## 2019-05-10 DIAGNOSIS — K82.9 DISEASE OF GALLBLADDER, UNSPECIFIED: Chronic | ICD-10-CM

## 2019-05-10 LAB
HCT VFR BLD CALC: 38.8 %
HGB BLD-MCNC: 13.2 G/DL
IRON SATN MFR SERPL: 41 %
IRON SERPL-MCNC: 135 UG/DL
MCHC RBC-ENTMCNC: 32.1 PG
MCHC RBC-ENTMCNC: 34 G/DL
MCV RBC AUTO: 94.4 FL
PLATELET # BLD AUTO: 90 K/UL
PMV BLD: 10.2 FL
RBC # BLD: 4.11 M/UL
RBC # FLD: 13.2 %
TIBC SERPL-MCNC: 328 UG/DL
UIBC SERPL-MCNC: 193 UG/DL
WBC # FLD AUTO: 6.05 K/UL

## 2019-05-10 NOTE — ASSESSMENT
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

## 2019-05-10 NOTE — HISTORY OF PRESENT ILLNESS
[de-identified] : 57 yo man with extensive history of ETOH use, hepatomegaly and splenomegaly on sono done in 2018; has anemia and mild thrombocytopenia since at least 2016; noted to have hepatic steatosis in 2016 on sono and ct scan; denies mucocutaneous bleeding\par \par MRI abdomen revealed cirrhosis and portal hypertension\par \par

## 2019-05-10 NOTE — REVIEW OF SYSTEMS
[Constipation] : constipation [Abdominal Pain] : no abdominal pain [Negative] : Allergic/Immunologic

## 2019-05-13 LAB — FERRITIN SERPL-MCNC: 62 NG/ML

## 2019-05-20 DIAGNOSIS — D69.6 THROMBOCYTOPENIA, UNSPECIFIED: ICD-10-CM

## 2019-09-13 ENCOUNTER — APPOINTMENT (OUTPATIENT)
Dept: OTOLARYNGOLOGY | Facility: CLINIC | Age: 59
End: 2019-09-13
Payer: MEDICAID

## 2019-09-13 DIAGNOSIS — R49.0 DYSPHONIA: ICD-10-CM

## 2019-09-13 DIAGNOSIS — H91.90 UNSPECIFIED HEARING LOSS, UNSPECIFIED EAR: ICD-10-CM

## 2019-09-13 DIAGNOSIS — R42 DIZZINESS AND GIDDINESS: ICD-10-CM

## 2019-09-13 DIAGNOSIS — K21.9 GASTRO-ESOPHAGEAL REFLUX DISEASE W/OUT ESOPHAGITIS: ICD-10-CM

## 2019-09-13 PROCEDURE — 31575 DIAGNOSTIC LARYNGOSCOPY: CPT

## 2019-09-13 PROCEDURE — 99204 OFFICE O/P NEW MOD 45 MIN: CPT | Mod: 25

## 2019-09-13 PROCEDURE — 92550 TYMPANOMETRY & REFLEX THRESH: CPT

## 2019-09-13 PROCEDURE — 92557 COMPREHENSIVE HEARING TEST: CPT

## 2019-09-13 NOTE — HISTORY OF PRESENT ILLNESS
[de-identified] : 59 year old patient is present today for dizziness, hearing loss, and dysphonia \par He states he had dizziness years ago but has not had any dizziness since then.  He does have bilateral hearing loss, for many years. Worsening over time. He had an audiogram today.\par  \par He c/o hoarse voice that has been intermittent for a long time. little discomfort as per patient. He denies reflux. He denies PND. No dysphagia. He has a history of acid reflux.

## 2019-09-13 NOTE — ASSESSMENT
[FreeTextEntry1] : AUdiogram reviewed today. Cleared for hearing aids. \par \par Intermittent dysphonia related to acid reflux. \par \par RTC in 6M

## 2019-09-24 ENCOUNTER — OUTPATIENT (OUTPATIENT)
Dept: OUTPATIENT SERVICES | Facility: HOSPITAL | Age: 59
LOS: 1 days | Discharge: HOME | End: 2019-09-24

## 2019-09-24 DIAGNOSIS — M16.11 UNILATERAL PRIMARY OSTEOARTHRITIS, RIGHT HIP: ICD-10-CM

## 2019-09-24 DIAGNOSIS — I10 ESSENTIAL (PRIMARY) HYPERTENSION: ICD-10-CM

## 2019-09-24 DIAGNOSIS — K82.9 DISEASE OF GALLBLADDER, UNSPECIFIED: Chronic | ICD-10-CM

## 2019-10-01 NOTE — ASU PATIENT PROFILE, ADULT - NS PRO AD PATIENT TYPE ON CHART
Health Care Proxy (HCP) Health Care Proxy (HCP)/life sustaining form  on chart/Do Not Resuscitate (DNR)

## 2019-10-02 ENCOUNTER — OUTPATIENT (OUTPATIENT)
Dept: OUTPATIENT SERVICES | Facility: HOSPITAL | Age: 59
LOS: 1 days | Discharge: HOME | End: 2019-10-02

## 2019-10-02 VITALS
DIASTOLIC BLOOD PRESSURE: 81 MMHG | HEIGHT: 71 IN | TEMPERATURE: 98 F | WEIGHT: 164.91 LBS | OXYGEN SATURATION: 96 % | SYSTOLIC BLOOD PRESSURE: 113 MMHG

## 2019-10-02 VITALS — RESPIRATION RATE: 18 BRPM | HEART RATE: 67 BPM | DIASTOLIC BLOOD PRESSURE: 70 MMHG | SYSTOLIC BLOOD PRESSURE: 140 MMHG

## 2019-10-02 DIAGNOSIS — K82.9 DISEASE OF GALLBLADDER, UNSPECIFIED: Chronic | ICD-10-CM

## 2019-10-02 DIAGNOSIS — Z96.641 PRESENCE OF RIGHT ARTIFICIAL HIP JOINT: Chronic | ICD-10-CM

## 2019-10-02 RX ORDER — TRAZODONE HCL 50 MG
1 TABLET ORAL
Qty: 0 | Refills: 0 | DISCHARGE

## 2019-10-02 RX ORDER — POTASSIUM CHLORIDE 20 MEQ
1 PACKET (EA) ORAL
Qty: 0 | Refills: 0 | DISCHARGE

## 2019-10-02 NOTE — PACU DISCHARGE NOTE - COMMENTS
59 y o male S/P Right ECCE with IOL Implant, LSB/MAC without complications. VS /90 HR 61 RR 16 SaO2 99%.

## 2019-10-04 ENCOUNTER — EMERGENCY (EMERGENCY)
Facility: HOSPITAL | Age: 59
LOS: 0 days | Discharge: HOME | End: 2019-10-04
Attending: STUDENT IN AN ORGANIZED HEALTH CARE EDUCATION/TRAINING PROGRAM | Admitting: STUDENT IN AN ORGANIZED HEALTH CARE EDUCATION/TRAINING PROGRAM
Payer: MEDICAID

## 2019-10-04 ENCOUNTER — EMERGENCY (EMERGENCY)
Facility: HOSPITAL | Age: 59
LOS: 0 days | Discharge: HOME | End: 2019-10-04
Attending: EMERGENCY MEDICINE | Admitting: EMERGENCY MEDICINE
Payer: MEDICAID

## 2019-10-04 VITALS
DIASTOLIC BLOOD PRESSURE: 98 MMHG | TEMPERATURE: 97 F | RESPIRATION RATE: 18 BRPM | SYSTOLIC BLOOD PRESSURE: 171 MMHG | HEART RATE: 82 BPM | OXYGEN SATURATION: 97 %

## 2019-10-04 VITALS
RESPIRATION RATE: 16 BRPM | OXYGEN SATURATION: 97 % | HEART RATE: 68 BPM | SYSTOLIC BLOOD PRESSURE: 184 MMHG | DIASTOLIC BLOOD PRESSURE: 93 MMHG

## 2019-10-04 VITALS
HEART RATE: 68 BPM | DIASTOLIC BLOOD PRESSURE: 93 MMHG | TEMPERATURE: 98 F | OXYGEN SATURATION: 97 % | SYSTOLIC BLOOD PRESSURE: 202 MMHG | RESPIRATION RATE: 18 BRPM | WEIGHT: 171.96 LBS
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
## 2019-10-04 DIAGNOSIS — R42 DIZZINESS AND GIDDINESS: ICD-10-CM

## 2019-10-04 DIAGNOSIS — S01.111D LACERATION WITHOUT FOREIGN BODY OF RIGHT EYELID AND PERIOCULAR AREA, SUBSEQUENT ENCOUNTER: ICD-10-CM

## 2019-10-04 DIAGNOSIS — Z96.641 PRESENCE OF RIGHT ARTIFICIAL HIP JOINT: Chronic | ICD-10-CM

## 2019-10-04 DIAGNOSIS — R11.0 NAUSEA: ICD-10-CM

## 2019-10-04 DIAGNOSIS — K82.9 DISEASE OF GALLBLADDER, UNSPECIFIED: Chronic | ICD-10-CM
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
## 2019-10-04 DIAGNOSIS — I10 ESSENTIAL (PRIMARY) HYPERTENSION: ICD-10-CM

## 2019-10-04 DIAGNOSIS — S80.02XA CONTUSION OF LEFT KNEE, INITIAL ENCOUNTER: ICD-10-CM

## 2019-10-04 DIAGNOSIS — S09.90XA UNSPECIFIED INJURY OF HEAD, INITIAL ENCOUNTER: ICD-10-CM
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
## 2019-10-04 DIAGNOSIS — S01.81XA LACERATION WITHOUT FOREIGN BODY OF OTHER PART OF HEAD, INITIAL ENCOUNTER: ICD-10-CM

## 2019-10-04 DIAGNOSIS — Z02.9 ENCOUNTER FOR ADMINISTRATIVE EXAMINATIONS, UNSPECIFIED: ICD-10-CM

## 2019-10-04 DIAGNOSIS — M25.551 PAIN IN RIGHT HIP: ICD-10-CM

## 2019-10-04 PROBLEM — Z87.09 PERSONAL HISTORY OF OTHER DISEASES OF THE RESPIRATORY SYSTEM: Chronic | Status: ACTIVE | Noted: 2019-10-02

## 2019-10-04 PROBLEM — K21.9 GASTRO-ESOPHAGEAL REFLUX DISEASE WITHOUT ESOPHAGITIS: Chronic | Status: ACTIVE | Noted: 2019-10-02

## 2019-10-04 LAB
ALBUMIN SERPL ELPH-MCNC: 4.1 G/DL — SIGNIFICANT CHANGE UP (ref 3.5–5.2)
ALP SERPL-CCNC: 85 U/L — SIGNIFICANT CHANGE UP (ref 30–115)
ALT FLD-CCNC: 12 U/L — SIGNIFICANT CHANGE UP (ref 0–41)
ANION GAP SERPL CALC-SCNC: 9 MMOL/L — SIGNIFICANT CHANGE UP (ref 7–14)
APPEARANCE UR: CLEAR — SIGNIFICANT CHANGE UP
APTT BLD: 31.2 SEC — SIGNIFICANT CHANGE UP (ref 27–39.2)
AST SERPL-CCNC: 20 U/L — SIGNIFICANT CHANGE UP (ref 0–41)
BACTERIA # UR AUTO: ABNORMAL
BASOPHILS # BLD AUTO: 0.03 K/UL — SIGNIFICANT CHANGE UP (ref 0–0.2)
BASOPHILS NFR BLD AUTO: 0.5 % — SIGNIFICANT CHANGE UP (ref 0–1)
BILIRUB SERPL-MCNC: 1.2 MG/DL — SIGNIFICANT CHANGE UP (ref 0.2–1.2)
BILIRUB UR-MCNC: NEGATIVE — SIGNIFICANT CHANGE UP
BUN SERPL-MCNC: 16 MG/DL — SIGNIFICANT CHANGE UP (ref 10–20)
CALCIUM SERPL-MCNC: 8.9 MG/DL — SIGNIFICANT CHANGE UP (ref 8.5–10.1)
CHLORIDE SERPL-SCNC: 108 MMOL/L — SIGNIFICANT CHANGE UP (ref 98–110)
CO2 SERPL-SCNC: 25 MMOL/L — SIGNIFICANT CHANGE UP (ref 17–32)
COLOR SPEC: YELLOW — SIGNIFICANT CHANGE UP
CREAT SERPL-MCNC: 1 MG/DL — SIGNIFICANT CHANGE UP (ref 0.7–1.5)
DIFF PNL FLD: ABNORMAL
EOSINOPHIL # BLD AUTO: 0.07 K/UL — SIGNIFICANT CHANGE UP (ref 0–0.7)
EOSINOPHIL NFR BLD AUTO: 1.1 % — SIGNIFICANT CHANGE UP (ref 0–8)
EPI CELLS # UR: 1 /HPF — SIGNIFICANT CHANGE UP (ref 0–5)
ETHANOL SERPL-MCNC: <10 MG/DL — SIGNIFICANT CHANGE UP
GLUCOSE SERPL-MCNC: 103 MG/DL — HIGH (ref 70–99)
GLUCOSE UR QL: NEGATIVE — SIGNIFICANT CHANGE UP
HCT VFR BLD CALC: 36.7 % — LOW (ref 42–52)
HGB BLD-MCNC: 12.1 G/DL — LOW (ref 14–18)
HYALINE CASTS # UR AUTO: 0 /LPF — SIGNIFICANT CHANGE UP (ref 0–7)
IMM GRANULOCYTES NFR BLD AUTO: 0.3 % — SIGNIFICANT CHANGE UP (ref 0.1–0.3)
INR BLD: 1.1 RATIO — SIGNIFICANT CHANGE UP (ref 0.65–1.3)
KETONES UR-MCNC: NEGATIVE — SIGNIFICANT CHANGE UP
LACTATE SERPL-SCNC: 0.8 MMOL/L — SIGNIFICANT CHANGE UP (ref 0.5–2.2)
LEUKOCYTE ESTERASE UR-ACNC: NEGATIVE — SIGNIFICANT CHANGE UP
LIDOCAIN IGE QN: 34 U/L — SIGNIFICANT CHANGE UP (ref 7–60)
LYMPHOCYTES # BLD AUTO: 0.79 K/UL — LOW (ref 1.2–3.4)
LYMPHOCYTES # BLD AUTO: 12 % — LOW (ref 20.5–51.1)
MCHC RBC-ENTMCNC: 30.3 PG — SIGNIFICANT CHANGE UP (ref 27–31)
MCHC RBC-ENTMCNC: 33 G/DL — SIGNIFICANT CHANGE UP (ref 32–37)
MCV RBC AUTO: 91.8 FL — SIGNIFICANT CHANGE UP (ref 80–94)
MONOCYTES # BLD AUTO: 0.51 K/UL — SIGNIFICANT CHANGE UP (ref 0.1–0.6)
MONOCYTES NFR BLD AUTO: 7.7 % — SIGNIFICANT CHANGE UP (ref 1.7–9.3)
NEUTROPHILS # BLD AUTO: 5.19 K/UL — SIGNIFICANT CHANGE UP (ref 1.4–6.5)
NEUTROPHILS NFR BLD AUTO: 78.4 % — HIGH (ref 42.2–75.2)
NITRITE UR-MCNC: NEGATIVE — SIGNIFICANT CHANGE UP
NRBC # BLD: 0 /100 WBCS — SIGNIFICANT CHANGE UP (ref 0–0)
PH UR: 6 — SIGNIFICANT CHANGE UP (ref 5–8)
PLATELET # BLD AUTO: 100 K/UL — LOW (ref 130–400)
POTASSIUM SERPL-MCNC: 4.4 MMOL/L — SIGNIFICANT CHANGE UP (ref 3.5–5)
POTASSIUM SERPL-SCNC: 4.4 MMOL/L — SIGNIFICANT CHANGE UP (ref 3.5–5)
PROT SERPL-MCNC: 6.5 G/DL — SIGNIFICANT CHANGE UP (ref 6–8)
PROT UR-MCNC: NEGATIVE — SIGNIFICANT CHANGE UP
PROTHROM AB SERPL-ACNC: 12.6 SEC — SIGNIFICANT CHANGE UP (ref 9.95–12.87)
RBC # BLD: 4 M/UL — LOW (ref 4.7–6.1)
RBC # FLD: 14.4 % — SIGNIFICANT CHANGE UP (ref 11.5–14.5)
RBC CASTS # UR COMP ASSIST: 1 /HPF — SIGNIFICANT CHANGE UP (ref 0–4)
SODIUM SERPL-SCNC: 142 MMOL/L — SIGNIFICANT CHANGE UP (ref 135–146)
SP GR SPEC: 1.02 — SIGNIFICANT CHANGE UP (ref 1.01–1.02)
UROBILINOGEN FLD QL: SIGNIFICANT CHANGE UP
WBC # BLD: 6.61 K/UL — SIGNIFICANT CHANGE UP (ref 4.8–10.8)
WBC # FLD AUTO: 6.61 K/UL — SIGNIFICANT CHANGE UP (ref 4.8–10.8)
WBC UR QL: 2 /HPF — SIGNIFICANT CHANGE UP (ref 0–5)

## 2019-10-04 PROCEDURE — 93010 ELECTROCARDIOGRAM REPORT: CPT

## 2019-10-04 PROCEDURE — 71260 CT THORAX DX C+: CPT | Mod: 26

## 2019-10-04 PROCEDURE — 74177 CT ABD & PELVIS W/CONTRAST: CPT | Mod: 26

## 2019-10-04 PROCEDURE — 70486 CT MAXILLOFACIAL W/O DYE: CPT | Mod: 26

## 2019-10-04 PROCEDURE — 72170 X-RAY EXAM OF PELVIS: CPT | Mod: 26

## 2019-10-04 PROCEDURE — 72125 CT NECK SPINE W/O DYE: CPT | Mod: 26

## 2019-10-04 PROCEDURE — 70450 CT HEAD/BRAIN W/O DYE: CPT | Mod: 26

## 2019-10-04 PROCEDURE — 99285 EMERGENCY DEPT VISIT HI MDM: CPT | Mod: 25

## 2019-10-04 PROCEDURE — 12005 RPR S/N/A/GEN/TRK12.6-20.0CM: CPT

## 2019-10-04 PROCEDURE — 73562 X-RAY EXAM OF KNEE 3: CPT | Mod: 26,LT

## 2019-10-04 PROCEDURE — 99284 EMERGENCY DEPT VISIT MOD MDM: CPT

## 2019-10-04 PROCEDURE — 71045 X-RAY EXAM CHEST 1 VIEW: CPT | Mod: 26

## 2019-10-04 RX ORDER — KETOROLAC TROMETHAMINE 30 MG/ML
30 SYRINGE (ML) INJECTION ONCE
Refills: 0 | Status: DISCONTINUED | OUTPATIENT
Start: 2019-10-04 | End: 2019-10-04

## 2019-10-04 RX ORDER — TETANUS TOXOID, REDUCED DIPHTHERIA TOXOID AND ACELLULAR PERTUSSIS VACCINE, ADSORBED 5; 2.5; 8; 8; 2.5 [IU]/.5ML; [IU]/.5ML; UG/.5ML; UG/.5ML; UG/.5ML
0.5 SUSPENSION INTRAMUSCULAR ONCE
Refills: 0 | Status: DISCONTINUED | OUTPATIENT
Start: 2019-10-04 | End: 2019-10-04

## 2019-10-04 RX ORDER — MORPHINE SULFATE 50 MG/1
6 CAPSULE, EXTENDED RELEASE ORAL ONCE
Refills: 0 | Status: DISCONTINUED | OUTPATIENT
Start: 2019-10-04 | End: 2019-10-04

## 2019-10-04 RX ORDER — ACETAMINOPHEN 500 MG
650 TABLET ORAL ONCE
Refills: 0 | Status: COMPLETED | OUTPATIENT
Start: 2019-10-04 | End: 2019-10-04

## 2019-10-04 RX ORDER — MECLIZINE HCL 12.5 MG
50 TABLET ORAL ONCE
Refills: 0 | Status: COMPLETED | OUTPATIENT
Start: 2019-10-04 | End: 2019-10-04

## 2019-10-04 RX ORDER — ONDANSETRON 8 MG/1
4 TABLET, FILM COATED ORAL ONCE
Refills: 0 | Status: COMPLETED | OUTPATIENT
Start: 2019-10-04 | End: 2019-10-04

## 2019-10-04 RX ADMIN — ONDANSETRON 4 MILLIGRAM(S): 8 TABLET, FILM COATED ORAL at 10:58

## 2019-10-04 RX ADMIN — Medication 30 MILLIGRAM(S): at 10:00

## 2019-10-04 RX ADMIN — MORPHINE SULFATE 6 MILLIGRAM(S): 50 CAPSULE, EXTENDED RELEASE ORAL at 03:32

## 2019-10-04 RX ADMIN — Medication 50 MILLIGRAM(S): at 10:58

## 2019-10-04 RX ADMIN — Medication 30 MILLIGRAM(S): at 10:59

## 2019-10-04 RX ADMIN — Medication 650 MILLIGRAM(S): at 14:43

## 2019-10-04 NOTE — ED ADULT TRIAGE NOTE - CHIEF COMPLAINT QUOTE
pt biba for s/p fall. as per EMS patient was sleeping and rolled off the bed hit his head on the night stand. Denies LOC, no blood thinners. pt has a laceration to the forehead.

## 2019-10-04 NOTE — ED PROVIDER NOTE - NEUROLOGICAL, MLM
Alert and oriented x 3, CN II-XII intact, 5/5 UE and LE strength, normal finger to nose, no focal deficits, no motor or sensory deficits.

## 2019-10-04 NOTE — ED PROVIDER NOTE - PHYSICAL EXAMINATION
Vital Signs: I have reviewed the initial vital signs.  Constitutional: NAD, well-nourished, appears stated age, no acute distress.  HEENT: There is a 5 cm x 5 cm x 5 cm stellate laceration above the patient's right eyebrow. Airway patent, moist MM, no erythema/swelling/deformity of oral structures. EOMI, PERRLA.  CV: regular rate, regular rhythm, well-perfused extremities, 2+ b/l DP and radial pulses equal.  Lungs: BCTA, no increased WOB.  ABD: NTND, no guarding or rebound, no pulsatile mass, no hernias.   MSK: Neck supple, nontender, nl ROM, no stepoff. Chest nontender. Back nontender in TLS spine or to b/l bony structures or flanks. Ext nontender, nl rom, no deformity.   INTEG: Skin warm, dry, no rash.  NEURO: A&Ox3, moving all extremities, normal speech  PSYCH: Calm, cooperative, normal affect and interaction.

## 2019-10-04 NOTE — ED PROVIDER NOTE - CLINICAL SUMMARY MEDICAL DECISION MAKING FREE TEXT BOX
NO acute ED events. lac oozing and dizzines resolved. Pt walking in ED w/o complaint. no events. Pt stable for dc w/ PMD f/up, and care as discussed.  Pt/ family understands plan and signs and symptoms for ED return.

## 2019-10-04 NOTE — ED PROVIDER NOTE - CARE PLAN
Principal Discharge DX:	Laceration of face  Secondary Diagnosis:	Fall  Secondary Diagnosis:	Contusion  Secondary Diagnosis:	Head injury

## 2019-10-04 NOTE — ED PROVIDER NOTE - NEURO NEGATIVE STATEMENT, MLM
+dizziness, no loss of consciousness, no gait abnormality, no headache, no sensory deficits, and no weakness.

## 2019-10-04 NOTE — ED PROVIDER NOTE - CLINICAL SUMMARY MEDICAL DECISION MAKING FREE TEXT BOX
60 y/o male not on anticoagulation presented to ED after falling out of bed and hitting head on ground. No LOC. Now with headache and laceration to forehead. Advanced imaging obtained and reviewed with pt, no ICH. Laceration repairs. Results reviewed and discussed with pt and printed for patient. Anticipatory guidance given including close outpatient followup. Strict return precautions given. Pt verbalizes understanding of and agrees with plan.

## 2019-10-04 NOTE — ED PROVIDER NOTE - ENMT, MLM
Airway patent, Nasal mucosa clear. Mouth with normal mucosa. Throat has no vesicles, no oropharyngeal exudates and uvula is midline. Repaired laceration to the right eyebrow

## 2019-10-04 NOTE — ED ADULT NURSE NOTE - OBJECTIVE STATEMENT
Pt s/p fall at NH and returns to ED for dizziness, nausea, and to have dried blood cleaned and removed from face.

## 2019-10-04 NOTE — ED PROVIDER NOTE - CARE PROVIDER_API CALL
Yoel Heller)  Orthopaedic Surgery  [address] [place] [address]  Phone: [phone]  Fax: [phone]  Follow Up Time: 7-10 Days

## 2019-10-04 NOTE — ED ADULT NURSE NOTE - CHIEF COMPLAINT QUOTE
as per ems, [facility] would not accept patient because he is c/o nausea and dizziness and because he has dried blood on his face. patient was just discharged from ED for fall with laceration to his head

## 2019-10-04 NOTE — ED ADULT NURSE NOTE - OBJECTIVE STATEMENT
Patient presents to ED BIBA s/p rolling and falling out of bed and hitting head on night stand before ground. Patient denies LOC, use of blood thinners. Patient from [facility]. Patient noted with dressing covering large laceration to forehead. Patient c/o splitting headache and pain to left knee. Patient states that he had cataract sx yesterday.

## 2019-10-04 NOTE — ED PROVIDER NOTE - ATTENDING CONTRIBUTION TO CARE
58 y/o M pmh as noted, sent form NH for oozing from repaired lac and dizziness. Pt was seen for fall earlier today,, had neg pan scan, forehead lac repaired, dc to NH. No v/d, cp, sob, neuro deficit.  ROS PE above. Pt is NAD, minimal oozing from wound, resolves w/ pressure, pt ambulating at baseline w/ walker, neuro at baseline.  IMP: dizziness  P: EKG, no labs indicated as just completed today, reassess.

## 2019-10-04 NOTE — ED PROVIDER NOTE - ATTENDING CONTRIBUTION TO CARE
I personally evaluated the patient. I reviewed the Resident’s or Physician Assistant’s note (as assigned above), and agree with the findings and plan except as documented in my note.    Pt is a 60 y/o male not on blood thinners, presents to ED after falling out of bed 2 hours PTA, hitting forehead on ground. Now c/o frontal HA, left knee pain, right hip pain. Pain is moderate, constant, worse since onset. + laceration to forehead. No neck pain, chest pain, SOB, abd pain.    Constitutional: Well developed, well nourished. NAD  Head: Normocephalic. + Large stellate laceration to right forehead, no active bleeding.  Eyes: PERRL. EOMI. No Raccoon eyes.   ENT: No nasal discharge. No septal hematoma. No Ortega sign. Mucous membranes moist.  Neck: Supple. Painless ROM. No midline tenderness, stepoffs.  Cardiovascular: Normal S1, S2. Regular rate and rhythm. No murmurs, rubs, or gallops.  Pulmonary: Normal respiratory rate and effort. Lungs clear to auscultation bilaterally. No wheezing, rales, or rhonchi.  CHEST: No chest wall tenderness, crepitus.  Abdominal: Soft. Nondistended. Nontender. No rebound, guarding, rigidity.  BACK: No midline T/L/S tenderness, stepoffs. No saddle paresthesia.  Extremities. Pelvis stable. No traumatic deformities of extremities. + tenderness and ecchymosis to left knee, NVI distally.  Skin: No rashes, cyanosis, lacerations, abrasions.  Neuro: AAOx3. Strength 5/5 in all extremities. Sensation intact throughout. No focal neurological deficits.  Psych: Normal mood. Normal affect.

## 2019-10-04 NOTE — ED ADULT TRIAGE NOTE - CHIEF COMPLAINT QUOTE
as per ems, [facility] would not accept patient because he is c/o nausea and dizziness and because he has dried blood on his face. patient was just discharged from ED for fall with laceration to his head

## 2019-10-04 NOTE — ED PROVIDER NOTE - NSFOLLOWUPINSTRUCTIONS_ED_ALL_ED_FT
Laceration    A laceration is a cut that goes through all of the layers of the skin and into the tissue that is right under the skin. Some lacerations heal on their own. Others need to be closed with skin adhesive strips, skin glue, stitches (sutures), or staples. Proper laceration care minimizes the risk of infection and helps the laceration to heal better.  If non-absorbable stitches or staples have been placed, they must be taken out within the time frame instructed by your healthcare provider.    SEEK IMMEDIATE MEDICAL CARE IF YOU HAVE ANY OF THE FOLLOWING SYMPTOMS: swelling around the wound, worsening pain, drainage from the wound, red streaking going away from your wound, inability to move finger or toe near the laceration, or discoloration of skin near the laceration.    Contusion    A contusion is a deep bruise. Contusions are the result of a blunt injury to tissues and muscle fibers under the skin. The skin overlying the contusion may turn blue, purple, or yellow. Symptoms also include pain and swelling in the injured area.    SEEK IMMEDIATE MEDICAL CARE IF YOU HAVE ANY OF THE FOLLOWING SYMPTOMS: severe pain, numbness, tingling, pain, weakness, or skin color/temperature change in any part of your body distal to the injury.

## 2019-10-04 NOTE — ED PROVIDER NOTE - NSFOLLOWUPINSTRUCTIONS_ED_ALL_ED_FT
PLEASE FOLLOW UP WITH YOUR PMD (OR MEDICINE CLINIC) AND ORTHO    Nausea / Vomiting    Nausea is the feeling that you have to vomit. As nausea gets worse, it can lead to vomiting. Vomiting puts you at an increased risk for dehydration. Older adults and people with other diseases or a weak immune system are at higher risk for dehydration. Drink clear fluids in small but frequent amounts as tolerated. Eat bland, easy-to-digest foods in small amounts as tolerated.    SEEK IMMEDIATE MEDICAL CARE IF YOU HAVE ANY OF THE FOLLOWING SYMPTOMS: fever, inability to keep sufficient fluids down, black or bloody vomitus, black or bloody stools, lightheadedness/dizziness, chest pain, severe headache, rash, shortness of breath, cold or clammy skin, confusion, pain with urination, or any signs of dehydration.     Dizziness    Dizziness can manifest as a feeling of unsteadiness or light-headedness. You may feel like you are about to faint. This condition can be caused by a number of things, including medicines, dehydration, or illness. Drink enough fluid to keep your urine clear or pale yellow. Do not drink alcohol and limit your caffeine intake. Avoid quick or sudden movements.  Rise slowly from chairs and steady yourself until you feel okay. In the morning, first sit up on the side of the bed.    SEEK IMMEDIATE MEDICAL CARE IF YOU HAVE ANY OF THE FOLLOWING SYMPTOMS: vomiting, changes in your vision or speech, weakness in your arms or legs, trouble speaking or swallowing, chest pain, abdominal pain, shortness of breath, sweating, bleeding, headache, neck pain, or fever.

## 2019-10-04 NOTE — ED PROVIDER NOTE - NSFOLLOWUPCLINICS_GEN_ALL_ED_FT
[facility]  Medicine  [address], [place]   Phone: [phone]  Fax:   Follow Up Time: Routine

## 2019-10-04 NOTE — ED ADULT NURSE NOTE - TEMPLATE
Attempted to reach the patient with the following: Left message on voicemail for the patient to call back.   Elvia Rey MA    Home phone not available called mobile.      General

## 2019-10-04 NOTE — ED PROVIDER NOTE - OBJECTIVE STATEMENT
Pt was brought in from nursing for a fall from bed; he has a hx of HTN, COPD, alcoholic hepatitis. Pt was brought in from nursing for a fall from bed; he has a hx of HTN, COPD, alcoholic hepatitis, schizophrenia. Pt states he has a hx of night terrors causing him to fall out of bed. Pt had night terror this evening and woke up after falling and hitting the right side of his forehead on the ground. No LOC, woke up as soon as he hit the ground. No n/v.

## 2019-10-04 NOTE — ED ADULT NURSE NOTE - NSIMPLEMENTINTERV_GEN_ALL_ED
Implemented All Fall with Harm Risk Interventions:  Asher to call system. Call bell, personal items and telephone within reach. Instruct patient to call for assistance. Room bathroom lighting operational. Non-slip footwear when patient is off stretcher. Physically safe environment: no spills, clutter or unnecessary equipment. Stretcher in lowest position, wheels locked, appropriate side rails in place. Provide visual cue, wrist band, yellow gown, etc. Monitor gait and stability. Monitor for mental status changes and reorient to person, place, and time. Review medications for side effects contributing to fall risk. Reinforce activity limits and safety measures with patient and family. Provide visual clues: red socks.

## 2019-10-04 NOTE — ED ADULT NURSE REASSESSMENT NOTE - NS ED NURSE REASSESS COMMENT FT1
Pt received in no acute distress, A&Ox4, report fall out of bed and hitting his head. Stellate laceration noted to the forehead. Pt placed on continuous cardiac monitor and pulse ox, vitals updated. Pt pending suture repair, and CT scans.

## 2019-10-04 NOTE — ED PROVIDER NOTE - PATIENT PORTAL LINK FT
You can access the FollowMyHealth Patient Portal offered by [facility] by registering at the following website: [email or web address]. By joining [employer]’s FollowMyHealth portal, you will also be able to view your health information using other applications (apps) compatible with our system.

## 2019-10-04 NOTE — ED PROVIDER NOTE - OBJECTIVE STATEMENT
60 yo M with hx of HTN, COPD, alcoholic hepatitis, schizophrenia, sent from [facility] for evaluation of dizziness, onset today, associated with nausea. Also due to weeping from laceration repair. No fever, no chills, no headache, no neck pain, no back pain, no abdominal pain. Pt was seen in the ED earlier last night for fall, he had a full trauma work up which was negative except for the laceration which was repair. NO other symptoms reported.

## 2019-10-04 NOTE — ED PROVIDER NOTE - PROGRESS NOTE DETAILS
Laceration repaired. Patient to be discharged from ED. Any available test results were discussed with patient and/or family. Verbal instructions given, including instructions to return to ED immediately for any new, worsening, or concerning symptoms. Patient endorsed understanding. Written discharge instructions additionally given, including follow-up plan.

## 2019-10-04 NOTE — ED PROVIDER NOTE - PATIENT PORTAL LINK FT
You can access the FollowMyHealth Patient Portal offered by [facility] by registering at the following website: [email or web address]. By joining [employer]’s FollowMyHealth portal, you will also be able to view your health information using other applications (apps) compatible with our system.

## 2019-10-17 DIAGNOSIS — K21.9 GASTRO-ESOPHAGEAL REFLUX DISEASE WITHOUT ESOPHAGITIS: ICD-10-CM

## 2019-10-17 DIAGNOSIS — H25.11 AGE-RELATED NUCLEAR CATARACT, RIGHT EYE: ICD-10-CM

## 2019-10-17 DIAGNOSIS — I10 ESSENTIAL (PRIMARY) HYPERTENSION: ICD-10-CM

## 2019-11-19 NOTE — ASU PATIENT PROFILE, ADULT - PMH
Acid reflux disease    Alcohol abuse  LAST DRINK APPROX ONE YEAR AGO  Constipation    History of COPD    HTN (hypertension)    Schizo affective schizophrenia  DEPRESSSION ANXIETY  Sciatic leg pain  RIGHT

## 2019-11-19 NOTE — ASU PATIENT PROFILE, ADULT - NPO AFTER
CARDIOLOGY CONSULTATION NOTE ([facility])  Consult requested by:   MD Molina    Reason for Consultation:   New CHF on CXR, TTE with LV dysfunction and ER -30-35%, Serve MR,  Low flow-low gradient severe aortic valve stenosis vs pseudo severe aortic valve stenosis due to low LVEF.    History obtained by: Patient and medical record    Chief complaint:    Patient is a 77y old  Female who presents with a chief complaint of weakness, fall (28 Apr 2018 01:55)      HPI  78 y/o female who lives at home  present to ED for a fall at home. Patient present to ED with Hbg of 6.5 and guaiac positive stool.  EKG with subtle ST depression in lateral leads but neg trop and normal BP.   Seen by GI and requesting cardiac clearance due to r/o malgnancy and a ENDscopy      REVIEW OF SYMPTOMS: Cardiovascular:  See HPI. No chest pain,  No dyspnea,  No syncope,  No palpitations, No dizziness, No Orthopnea,      No Paroxsymal nocturnal dyspnea;  Respiratory:  No Dyspnea, No cough,     Genitourinary:  No dysuria, no hematuria; Gastrointestinal:  No nausea, no vomiting. No diarrhea.  No abdominal pain. No dark color stool, no melena ; Neurological: No headache, no dizziness, no slurred speech;  Psychiatric: No agitation, no anxiety.  ALL OTHER REVIEW OF SYSTEMS ARE NEGATIVE.    ALLERGIES: Allergies    No Known Allergies    Intolerances          CURRENT MEDICATIONS:  furosemide   Injectable 40 milliGRAM(s) IV Push every 12 hours  metoprolol tartrate 25 milliGRAM(s) Oral two times a day    ALBUTerol    0.083%   pantoprazole Infusion  dextrose 5%.  dextrose 50% Injectable  insulin lispro (HumaLOG) corrective regimen sliding scale  piperacillin/tazobactam IVPB.  sodium chloride 0.9% lock flush  vancomycin  IVPB  vancomycin  IVPB      HOME MEDICATIONS:    PAST MEDICAL HISTORY  Other type of osteoarthritis  Type 2 diabetes mellitus with complication, without long-term current use of insulin  Essential hypertension      PAST SURGICAL HISTORY  No significant past surgical history      FAMILY HISTORY:  No pertinent family history in first degree relatives      SOCIAL HISTORY:  Denies smoking/alcohol/drugs    CIGARETTES:       ALCOHOL:    DRUGS:    Vital Signs Last 24 Hrs  T(C): 36.7 (01 May 2018 07:56), Max: 37 (30 Apr 2018 15:00)  T(F): 98.1 (01 May 2018 07:56), Max: 98.6 (30 Apr 2018 15:00)  HR: 77 (01 May 2018 07:56) (77 - 104)  BP: 105/62 (01 May 2018 07:56) (101/57 - 139/63)  BP(mean): --  RR: 22 (01 May 2018 07:56) (19 - 22)  SpO2: 96% (01 May 2018 07:56) (90% - 100%)      PHYSICAL EXAM:  Constitutional: Comfortable . No acute distress.   HEENT: Atraumatic and normcephalic , neck is supple . no JVD. No carotid bruit. PEERL   CNS: A&Ox3. No focal deficits. EOMI. Cranial nerves II-IX are intact.   Lymph Nodes: Cervical : Not palpable.  Respiratory: CTAB  Cardiovascular: S1S2 RRR. No murmur/rubs or gallop.  Gastrointestinal: Soft non-tender and non distended . +Bowel sounds. negative Brasher's sign.  Extremities: No edema.   Psychiatric: Calm . no agitation.  Skin: No skin rash/ulcers visualized to face, hands or feet.    Intake and output:   04-30 @ 07:01  -  05-01 @ 07:00  --------------------------------------------------------  IN: 360 mL / OUT: 2 mL / NET: 358 mL        LABS:                        8.3    13.8  )-----------( 383      ( 01 May 2018 07:19 )             25.2     05-01    130<L>  |  94<L>  |  17.0  ----------------------------<  165<H>  4.0   |  21.0<L>  |  1.14    Ca    8.2<L>      01 May 2018 07:19    TPro  5.9<L>  /  Alb  2.9<L>  /  TBili  0.4  /  DBili  x   /  AST  74<H>  /  ALT  17  /  AlkPhos  60  04-30      ;p-BNP=        INTERPRETATION OF TELEMETRY: Reviewed by me.   ECG: Reviewed by me.     RADIOLOGY & ADDITIONAL STUDIES:    X-ray:  reviewed by me.   CT scan:   MRI:   ECHO FINDINGS: Date:                : LVEF=          ; RV function:       ; Valvular abnormalities: No significant valvular abnormality.  Mitral valve:           ;  Aortic valve:              ;Tricuspid valve:         ; Pulmonary pressures:        mm Hg. Pericardium:      STRESS  FINDINGS: Date:            ;      CATHETERIZATION FINDINGS:  Date:              :  LAD:                       ;  LCx:                        ; RCA:                ; CARDIOLOGY CONSULTATION NOTE ([facility])  Consult requested by:   MD Molina    Reason for Consultation:   New CHF on CXR, TTE with LV dysfunction and ER -30-35%, Serve MR,  Low flow-low gradient severe aortic valve stenosis vs pseudo severe aortic valve stenosis due to low LVEF.    History obtained by: Patient and medical record    Chief complaint:    Patient is a 77y old  Female who presents with a chief complaint of weakness, fall (28 Apr 2018 01:55)      HPI  76 y/o female who lives at home  present to ED for a fall at home. Patient present to ED with Hbg of 6.5 and guaiac positive stool.  EKG with subtle ST depression in lateral leads but neg trop and normal BP.   Seen by GI and requesting cardiac clearance for an Endoscopy.  TTE with Serve       REVIEW OF SYMPTOMS: Cardiovascular:  See HPI. No chest pain,  No dyspnea,  No syncope,  No palpitations, No dizziness, No Orthopnea,      No Paroxsymal nocturnal dyspnea;  Respiratory:  No Dyspnea, No cough,     Genitourinary:  No dysuria, no hematuria; Gastrointestinal:  No nausea, no vomiting. No diarrhea.  No abdominal pain. No dark color stool, no melena ; Neurological: No headache, no dizziness, no slurred speech;  Psychiatric: No agitation, no anxiety.  ALL OTHER REVIEW OF SYSTEMS ARE NEGATIVE.    ALLERGIES: Allergies    No Known Allergies    Intolerances          CURRENT MEDICATIONS:  furosemide   Injectable 40 milliGRAM(s) IV Push every 12 hours  metoprolol tartrate 25 milliGRAM(s) Oral two times a day    ALBUTerol    0.083%   pantoprazole Infusion  dextrose 5%.  dextrose 50% Injectable  insulin lispro (HumaLOG) corrective regimen sliding scale  piperacillin/tazobactam IVPB.  sodium chloride 0.9% lock flush  vancomycin  IVPB  vancomycin  IVPB      HOME MEDICATIONS:    PAST MEDICAL HISTORY  Other type of osteoarthritis  Type 2 diabetes mellitus with complication, without long-term current use of insulin  Essential hypertension      PAST SURGICAL HISTORY  No significant past surgical history      FAMILY HISTORY:  No pertinent family history in first degree relatives      SOCIAL HISTORY:  Denies smoking/alcohol/drugs    CIGARETTES:       ALCOHOL:    DRUGS:    Vital Signs Last 24 Hrs  T(C): 36.7 (01 May 2018 07:56), Max: 37 (30 Apr 2018 15:00)  T(F): 98.1 (01 May 2018 07:56), Max: 98.6 (30 Apr 2018 15:00)  HR: 77 (01 May 2018 07:56) (77 - 104)  BP: 105/62 (01 May 2018 07:56) (101/57 - 139/63)  BP(mean): --  RR: 22 (01 May 2018 07:56) (19 - 22)  SpO2: 96% (01 May 2018 07:56) (90% - 100%)      PHYSICAL EXAM:  Constitutional: Comfortable . No acute distress.   HEENT: Atraumatic and normcephalic , neck is supple . no JVD. No carotid bruit. PEERL   CNS: A&Ox3. No focal deficits. EOMI. Cranial nerves II-IX are intact.   Lymph Nodes: Cervical : Not palpable.  Respiratory: CTAB  Cardiovascular: S1S2 RRR. No murmur/rubs or gallop.  Gastrointestinal: Soft non-tender and non distended . +Bowel sounds. negative Brasher's sign.  Extremities: No edema.   Psychiatric: Calm . no agitation.  Skin: No skin rash/ulcers visualized to face, hands or feet.    Intake and output:   04-30 @ 07:01  -  05-01 @ 07:00  --------------------------------------------------------  IN: 360 mL / OUT: 2 mL / NET: 358 mL        LABS:                        8.3    13.8  )-----------( 383      ( 01 May 2018 07:19 )             25.2     05-01    130<L>  |  94<L>  |  17.0  ----------------------------<  165<H>  4.0   |  21.0<L>  |  1.14    Ca    8.2<L>      01 May 2018 07:19    TPro  5.9<L>  /  Alb  2.9<L>  /  TBili  0.4  /  DBili  x   /  AST  74<H>  /  ALT  17  /  AlkPhos  60  04-30      ;p-BNP=        INTERPRETATION OF TELEMETRY: Reviewed by me.   ECG: Reviewed by me.     RADIOLOGY & ADDITIONAL STUDIES:    X-ray:  reviewed by me.   CT scan:   MRI:   ECHO FINDINGS: Date:                : LVEF=          ; RV function:       ; Valvular abnormalities: No significant valvular abnormality.  Mitral valve:           ;  Aortic valve:              ;Tricuspid valve:         ; Pulmonary pressures:        mm Hg. Pericardium:      STRESS  FINDINGS: Date:            ;      CATHETERIZATION FINDINGS:  Date:              :  LAD:                       ;  LCx:                        ; RCA:                ; 00:00 CARDIOLOGY CONSULTATION NOTE ([facility])  Consult requested by:   MD Molina    Reason for Consultation:   New CHF on CXR, TTE with LV dysfunction and ER -30-35%, Sever MR,  Severe aortic valve stenosis vs pseudo severe aortic valve stenosis.     History obtained by: Patient and medical record    Chief complaint:    Patient is a 77y old  Female who presents with a chief complaint of weakness, fall (28 Apr 2018 01:55)    HPI  76 y/o female who lives at home  present to ED for a fall at home. Patient present to ED with Hbg of 6.5 and guaiac positive stool.  EKG with subtle ST depression in lateral leads but neg trop and normal BP.   Seen by GI and requesting cardiac clearance for an Endoscopy.  TTE with Sever MR and AS VS Pseudo AS due to low volume.   CXr with new CHF.        REVIEW OF SYMPTOMS: Cardiovascular:  No chest pain,  No dyspnea,  No syncope,  No palpitations, No dizziness, No Orthopnea,  No Paroxsymal nocturnal dyspnea;  Respiratory:  No Dyspnea, No cough,  no wheezing, no pain.  Genitourinary:  No dysuria, no hematuria; Gastrointestinal:  No nausea, no vomiting. No diarrhea.  No abdominal pain. No dark color stool, no melena ; Neurological: No headache, no dizziness, no slurred speech;  Psychiatric: No agitation, no anxiety.  ALL OTHER REVIEW OF SYSTEMS ARE NEGATIVE.    ALLERGIES: Allergies    CURRENT MEDICATIONS:  furosemide   Injectable 40 milliGRAM(s) IV Push every 12 hours  metoprolol tartrate 25 milliGRAM(s) Oral two times a day  ALBUTerol    0.083%   pantoprazole Infusion  dextrose 5%.  dextrose 50% Injectable  insulin lispro (HumaLOG) corrective regimen sliding scale  piperacillin/tazobactam IVPB.  sodium chloride 0.9% lock flush  vancomycin  IVPB  vancomycin  IVPB    HOME MEDICATIONS:  Toprol XL 50  Losartan 100mg po daily   metformin 500 po bid  Diclofenac prn  Hctz 25 po daily  Norvasc 10 mg daily    PAST MEDICAL HISTORY  Other type of osteoarthritis  Type 2 diabetes mellitus with complication, without long-term current use of insulin  Essential hypertension      PAST SURGICAL HISTORY  No significant past surgical history      FAMILY HISTORY:  No pertinent family history in first degree relatives      SOCIAL HISTORY:  Denies smoking/alcohol/drugs    Vital Signs Last 24 Hrs  T(C): 36.7 (01 May 2018 07:56), Max: 37 (30 Apr 2018 15:00)  T(F): 98.1 (01 May 2018 07:56), Max: 98.6 (30 Apr 2018 15:00)  HR: 77 (01 May 2018 07:56) (77 - 104)  BP: 105/62 (01 May 2018 07:56) (101/57 - 139/63)  BP(mean): --  RR: 22 (01 May 2018 07:56) (19 - 22)  SpO2: 96% (01 May 2018 07:56) (90% - 100%)    PHYSICAL EXAM:  Constitutional: Frail elderly ill appearing female, air hungry with 50% venti mask  HEENT: Atraumatic and normcephalic , neck is supple . no JVD. No carotid bruit. PEERL   CNS: A&Ox3. No focal deficits. EOMI. Cranial nerves II-IX are intact.   Lymph Nodes: Cervical : Not palpable.  Respiratory: severly diminished bilateral breath souths bases, rr   Cardiovascular: S1S2 RRR. + 2.6 systolic murmur/rubs or gallop.  Gastrointestinal: Soft non-tender and non distended . +Bowel sounds. negative Brasher's sign.  Extremities: No edema.   Psychiatric: Calm . no agitation.  Skin: No skin rash/ulcers visualized to face, hands or feet.    Intake and output:   04-30 @ 07:01  -  05-01 @ 07:00  --------------------------------------------------------  IN: 360 mL / OUT: 2 mL / NET: 358 mL        LABS:                        8.3    13.8  )-----------( 383      ( 01 May 2018 07:19 )             25.2     05-01    130<L>  |  94<L>  |  17.0  ----------------------------<  165<H>  4.0   |  21.0<L>  |  1.14    Ca    8.2<L>      01 May 2018 07:19    TPro  5.9<L>  /  Alb  2.9<L>  /  TBili  0.4  /  DBili  x   /  AST  74<H>  /  ALT  17  /  AlkPhos  60  04-30      ;p-BNP=        INTERPRETATION OF TELEMETRY: Reviewed by me.   ECG: Reviewed by me.     RADIOLOGY & ADDITIONAL STUDIES:    X-ray:  reviewed by me.   CT scan:   MRI:   ECHO FINDINGS: Date:                : LVEF=          ; RV function:       ; Valvular abnormalities: No significant valvular abnormality.  Mitral valve:           ;  Aortic valve:              ;Tricuspid valve:         ; Pulmonary pressures:        mm Hg. Pericardium:      STRESS  FINDINGS: Date:            ;      CATHETERIZATION FINDINGS:  Date:              :  LAD:                       ;  LCx:                        ; RCA:                ; CARDIOLOGY CONSULTATION NOTE ([facility])  Consult requested by:   MD Molina    Reason for Consultation:   New CHF on CXR, TTE with LV dysfunction and ER -30-35%, Sever MR,  Severe aortic valve stenosis vs pseudo severe aortic valve stenosis.     History obtained by: Patient and medical record    Chief complaint:    Patient is a 77y old  Female who presents with a chief complaint of weakness, fall (28 Apr 2018 01:55)    HPI  76 y/o female who lives at home  present to ED for a fall at home. Patient present to ED with Hbg of 6.5 and guaiac positive stool.  EKG with subtle ST depression in lateral leads but neg trop and normal BP.   Seen by GI and requesting cardiac clearance for an Endoscopy.  TTE with Sever MR and AS VS Pseudo AS due to low volume.   CXr with new CHF.        REVIEW OF SYMPTOMS: Cardiovascular:  No chest pain,  No dyspnea,  No syncope,  No palpitations, No dizziness, No Orthopnea,  No Paroxsymal nocturnal dyspnea;  Respiratory:  No Dyspnea, No cough,  no wheezing, no pain.  Genitourinary:  No dysuria, no hematuria; Gastrointestinal:  No nausea, no vomiting. No diarrhea.  No abdominal pain. No dark color stool, no melena ; Neurological: No headache, no dizziness, no slurred speech;  Psychiatric: No agitation, no anxiety.  ALL OTHER REVIEW OF SYSTEMS ARE NEGATIVE.    ALLERGIES: Allergies    CURRENT MEDICATIONS:  furosemide   Injectable 40 milliGRAM(s) IV Push every 12 hours  metoprolol tartrate 25 milliGRAM(s) Oral two times a day  ALBUTerol    0.083%   pantoprazole Infusion  dextrose 5%.  dextrose 50% Injectable  insulin lispro (HumaLOG) corrective regimen sliding scale  piperacillin/tazobactam IVPB.  sodium chloride 0.9% lock flush  vancomycin  IVPB  vancomycin  IVPB    HOME MEDICATIONS:  Toprol XL 50  Losartan 100mg po daily   metformin 500 po bid  Diclofenac prn  Hctz 25 po daily  Norvasc 10 mg daily    PAST MEDICAL HISTORY  Other type of osteoarthritis  Type 2 diabetes mellitus with complication, without long-term current use of insulin  Essential hypertension      PAST SURGICAL HISTORY  No significant past surgical history      FAMILY HISTORY:  No pertinent family history in first degree relatives      SOCIAL HISTORY:  Denies smoking/alcohol/drugs    Vital Signs Last 24 Hrs  T(C): 36.7 (01 May 2018 07:56), Max: 37 (30 Apr 2018 15:00)  T(F): 98.1 (01 May 2018 07:56), Max: 98.6 (30 Apr 2018 15:00)  HR: 77 (01 May 2018 07:56) (77 - 104)  BP: 105/62 (01 May 2018 07:56) (101/57 - 139/63)  BP(mean): --  RR: 22 (01 May 2018 07:56) (19 - 22)  SpO2: 96% (01 May 2018 07:56) (90% - 100%)    PHYSICAL EXAM:  Constitutional: Frail elderly ill appearing female, air hungry with 50% venti mask  HEENT: Atraumatic and normcephalic , neck is supple . no JVD. No carotid bruit. PEERL   CNS: A&Ox3. No focal deficits. EOMI. Cranial nerves II-IX are intact.   Lymph Nodes: Cervical : Not palpable.  Respiratory: severly diminished bilateral breath sounds bases, rr 22, irregular  Cardiovascular: S1S2 RRR. + 2/6 systolic murmur /rubs or gallop.  Gastrointestinal: Soft non-tender and non distended . +Bowel sounds. negative Brasher's sign.  Extremities: No edema.   Psychiatric: Calm . no agitation.  Skin: No skin rash/ulcers visualized to face, hands or feet.    Intake and output:   04-30 @ 07:01  -  05-01 @ 07:00  --------------------------------------------------------  IN: 360 mL / OUT: 2 mL / NET: 358 mL        LABS:                        8.3    13.8  )-----------( 383      ( 01 May 2018 07:19 )             25.2     05-01    130<L>  |  94<L>  |  17.0  ----------------------------<  165<H>  4.0   |  21.0<L>  |  1.14    Ca    8.2<L>      01 May 2018 07:19    TPro  5.9<L>  /  Alb  2.9<L>  /  TBili  0.4  /  DBili  x   /  AST  74<H>  /  ALT  17  /  AlkPhos  60  04-30      ;p-BNP=        INTERPRETATION OF TELEMETRY: Reviewed by me.   ECG: Reviewed by me.     RADIOLOGY & ADDITIONAL STUDIES:    X-ray:  reviewed by me.   CT scan:   MRI:   ECHO FINDINGS: Date:                : LVEF=          ; RV function:       ; Valvular abnormalities: No significant valvular abnormality.  Mitral valve:           ;  Aortic valve:              ;Tricuspid valve:         ; Pulmonary pressures:        mm Hg. Pericardium:      STRESS  FINDINGS: Date:            ;      CATHETERIZATION FINDINGS:  Date:              :  LAD:                       ;  LCx:                        ; RCA:                ; CARDIOLOGY CONSULTATION NOTE ([facility])  Consult requested by:   MD Molina    Reason for Consultation:   New CHF on CXR, TTE with LV dysfunction and ER -30-35%, Sever MR,  Severe aortic valve stenosis vs pseudo severe aortic valve stenosis.     History obtained by: Patient and medical record    Chief complaint:    Patient is a 77y old  Female who presents with a chief complaint of weakness, fall (28 Apr 2018 01:55)    HPI  76 y/o female who lives at home  present to ED for a fall at home. Patient present to ED with Hbg of 6.5 and guaiac positive stool.  EKG with subtle ST depression in lateral leads but neg trop and normal BP.   Seen by GI and requesting cardiac clearance for an Endoscopy.  TTE with Sever MR and  severe AS VS Pseudo AS due to low volume.   CXr with new CHF.        REVIEW OF SYMPTOMS: Cardiovascular:  No chest pain,  + dyspnea,  + syncope,  No palpitations, No dizziness, + Orthopnea,  No Paroxsymal nocturnal dyspnea;  Respiratory:  + Dyspnea, No cough,  no wheezing, no pain.  Genitourinary:  No dysuria, no hematuria; Gastrointestinal:  No nausea, no vomiting. No diarrhea.  No abdominal pain. No dark color stool, no melena ; Neurological: No headache, no dizziness, no slurred speech;  Psychiatric: No agitation, no anxiety.  ALL OTHER REVIEW OF SYSTEMS ARE NEGATIVE.    ALLERGIES: Allergies    CURRENT MEDICATIONS:  furosemide   Injectable 40 milliGRAM(s) IV Push every 12 hours  metoprolol tartrate 25 milliGRAM(s) Oral two times a day  ALBUTerol    0.083%   pantoprazole Infusion  dextrose 5%.  dextrose 50% Injectable  insulin lispro (HumaLOG) corrective regimen sliding scale  piperacillin/tazobactam IVPB.  sodium chloride 0.9% lock flush  vancomycin  IVPB  vancomycin  IVPB    HOME MEDICATIONS:  Toprol XL 50  Losartan 100mg po daily   metformin 500 po bid  Diclofenac prn  Hctz 25 po daily  Norvasc 10 mg daily    PAST MEDICAL HISTORY  Other type of osteoarthritis  Type 2 diabetes mellitus with complication, without long-term current use of insulin  Essential hypertension      PAST SURGICAL HISTORY  No significant past surgical history      FAMILY HISTORY:  No pertinent family history in first degree relatives      SOCIAL HISTORY:  Denies smoking/alcohol/drugs    Vital Signs Last 24 Hrs  T(C): 36.7 (01 May 2018 07:56), Max: 37 (30 Apr 2018 15:00)  T(F): 98.1 (01 May 2018 07:56), Max: 98.6 (30 Apr 2018 15:00)  HR: 77 (01 May 2018 07:56) (77 - 104)  BP: 105/62 (01 May 2018 07:56) (101/57 - 139/63)  BP(mean): --  RR: 22 (01 May 2018 07:56) (19 - 22)  SpO2: 96% (01 May 2018 07:56) (90% - 100%)    PHYSICAL EXAM:  Constitutional: Frail elderly ill appearing female, air hungry with 50% venti mask  HEENT: Atraumatic and normcephalic , neck is supple . no JVD. No carotid bruit. PEERL   CNS: A&Ox3. No focal deficits. EOMI. Cranial nerves II-IX are intact.   Lymph Nodes: Cervical : Not palpable.  Respiratory: severly diminished bilateral breath sounds bases, rr 22, irregular  Cardiovascular: S1S2 RRR. + 2/6 systolic murmur /rubs or gallop.  Gastrointestinal: Soft non-tender and non distended . +Bowel sounds. negative Brasher's sign.  Extremities: No edema.   Psychiatric: Calm . no agitation.  Skin: No skin rash/ulcers visualized to face, hands or feet.    Intake and output:   04-30 @ 07:01  -  05-01 @ 07:00  --------------------------------------------------------  IN: 360 mL / OUT: 2 mL / NET: 358 mL    LABS:                      8.3    13.8  )-----------( 383      ( 01 May 2018 07:19 )             25.2     05-01    130<L>  |  94<L>  |  17.0  ----------------------------<  165<H>  4.0   |  21.0<L>  |  1.14    Ca    8.2<L>      01 May 2018 07:19    TPro  5.9<L>  /  Alb  2.9<L>  /  TBili  0.4  /  DBili  x   /  AST  74<H>  /  ALT  17  /  AlkPhos  60  04-30        INTERPRETATION OF TELEMETRY: Reviewed by me.   ECG: Reviewed by me.     RADIOLOGY & ADDITIONAL STUDIES:    X-ray:  reviewed by me.

## 2019-11-20 ENCOUNTER — OUTPATIENT (OUTPATIENT)
Dept: OUTPATIENT SERVICES | Facility: HOSPITAL | Age: 59
LOS: 1 days | Discharge: HOME | End: 2019-11-20

## 2019-11-20 VITALS
SYSTOLIC BLOOD PRESSURE: 124 MMHG | HEIGHT: 70 IN | OXYGEN SATURATION: 96 % | TEMPERATURE: 99 F | RESPIRATION RATE: 15 BRPM | WEIGHT: 182.1 LBS | DIASTOLIC BLOOD PRESSURE: 66 MMHG | HEART RATE: 64 BPM

## 2019-11-20 VITALS — RESPIRATION RATE: 18 BRPM | DIASTOLIC BLOOD PRESSURE: 77 MMHG | SYSTOLIC BLOOD PRESSURE: 127 MMHG | HEART RATE: 62 BPM

## 2019-11-20 DIAGNOSIS — H26.9 UNSPECIFIED CATARACT: Chronic | ICD-10-CM

## 2019-11-20 DIAGNOSIS — Z96.641 PRESENCE OF RIGHT ARTIFICIAL HIP JOINT: Chronic | ICD-10-CM

## 2019-11-20 DIAGNOSIS — K82.9 DISEASE OF GALLBLADDER, UNSPECIFIED: Chronic | ICD-10-CM

## 2019-11-20 DIAGNOSIS — Z76.89 PERSONS ENCOUNTERING HEALTH SERVICES IN OTHER SPECIFIED CIRCUMSTANCES: Chronic | ICD-10-CM

## 2019-11-20 RX ORDER — SOD,AMMONIUM,POTASSIUM LACTATE
1 CREAM (GRAM) TOPICAL
Qty: 0 | Refills: 0 | DISCHARGE

## 2019-11-20 RX ORDER — SODIUM CHLORIDE 9 MG/ML
1000 INJECTION, SOLUTION INTRAVENOUS
Refills: 0 | Status: DISCONTINUED | OUTPATIENT
Start: 2019-11-20 | End: 2019-11-20

## 2019-11-20 RX ADMIN — SODIUM CHLORIDE 50 MILLILITER(S): 9 INJECTION, SOLUTION INTRAVENOUS at 13:44

## 2019-12-05 DIAGNOSIS — F41.9 ANXIETY DISORDER, UNSPECIFIED: ICD-10-CM

## 2019-12-05 DIAGNOSIS — F25.0 SCHIZOAFFECTIVE DISORDER, BIPOLAR TYPE: ICD-10-CM

## 2019-12-05 DIAGNOSIS — K21.9 GASTRO-ESOPHAGEAL REFLUX DISEASE WITHOUT ESOPHAGITIS: ICD-10-CM

## 2019-12-05 DIAGNOSIS — K59.00 CONSTIPATION, UNSPECIFIED: ICD-10-CM

## 2019-12-05 DIAGNOSIS — Z96.641 PRESENCE OF RIGHT ARTIFICIAL HIP JOINT: ICD-10-CM

## 2019-12-05 DIAGNOSIS — H25.89 OTHER AGE-RELATED CATARACT: ICD-10-CM

## 2019-12-05 DIAGNOSIS — M54.30 SCIATICA, UNSPECIFIED SIDE: ICD-10-CM

## 2019-12-05 DIAGNOSIS — I10 ESSENTIAL (PRIMARY) HYPERTENSION: ICD-10-CM

## 2019-12-05 DIAGNOSIS — Z87.09 PERSONAL HISTORY OF OTHER DISEASES OF THE RESPIRATORY SYSTEM: ICD-10-CM

## 2020-03-20 ENCOUNTER — APPOINTMENT (OUTPATIENT)
Dept: OTOLARYNGOLOGY | Facility: CLINIC | Age: 60
End: 2020-03-20

## 2020-04-30 NOTE — H&P PST ADULT - PSH
Follow up in 3 months to repeat Zoladex  PSA 2-3 days before next visit  Continue Vitamin D and Calcium  Continue finasteride 5 mg daily  Continue tamsulosin 0.4 mg daily  Continue Casodex 50 mg daily  
Gall bladder disease  LAP STEPHANIE

## 2020-08-11 ENCOUNTER — APPOINTMENT (OUTPATIENT)
Dept: HEMATOLOGY ONCOLOGY | Facility: CLINIC | Age: 60
End: 2020-08-11

## 2020-08-21 ENCOUNTER — OUTPATIENT (OUTPATIENT)
Dept: OUTPATIENT SERVICES | Facility: HOSPITAL | Age: 60
LOS: 1 days | Discharge: HOME | End: 2020-08-21

## 2020-08-21 ENCOUNTER — APPOINTMENT (OUTPATIENT)
Dept: HEMATOLOGY ONCOLOGY | Facility: CLINIC | Age: 60
End: 2020-08-21
Payer: MEDICAID

## 2020-08-21 ENCOUNTER — LABORATORY RESULT (OUTPATIENT)
Age: 60
End: 2020-08-21

## 2020-08-21 VITALS
SYSTOLIC BLOOD PRESSURE: 154 MMHG | TEMPERATURE: 98.4 F | DIASTOLIC BLOOD PRESSURE: 91 MMHG | BODY MASS INDEX: 25.52 KG/M2 | RESPIRATION RATE: 16 BRPM | WEIGHT: 183 LBS | HEART RATE: 67 BPM

## 2020-08-21 DIAGNOSIS — Z76.89 PERSONS ENCOUNTERING HEALTH SERVICES IN OTHER SPECIFIED CIRCUMSTANCES: Chronic | ICD-10-CM

## 2020-08-21 DIAGNOSIS — H26.9 UNSPECIFIED CATARACT: Chronic | ICD-10-CM

## 2020-08-21 DIAGNOSIS — Z96.641 PRESENCE OF RIGHT ARTIFICIAL HIP JOINT: Chronic | ICD-10-CM

## 2020-08-21 DIAGNOSIS — K82.9 DISEASE OF GALLBLADDER, UNSPECIFIED: Chronic | ICD-10-CM

## 2020-08-21 LAB
HCT VFR BLD CALC: 40.6 %
HGB BLD-MCNC: 13.9 G/DL
MCHC RBC-ENTMCNC: 31.4 PG
MCHC RBC-ENTMCNC: 34.2 G/DL
MCV RBC AUTO: 91.6 FL
PLATELET # BLD AUTO: 87 K/UL
PMV BLD: 11.7 FL
RBC # BLD: 4.43 M/UL
RBC # FLD: 13.8 %
WBC # FLD AUTO: 6.33 K/UL

## 2020-08-21 PROCEDURE — 99213 OFFICE O/P EST LOW 20 MIN: CPT

## 2020-08-21 NOTE — ASSESSMENT
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

## 2020-08-21 NOTE — HISTORY OF PRESENT ILLNESS
[de-identified] : 59 yo man with extensive history of ETOH use, hepatomegaly and splenomegaly on sono done in 2018; has anemia and mild thrombocytopenia since at least 2016; noted to have hepatic steatosis in 2016 on sono and ct scan; denies mucocutaneous bleeding\par \par MRI abdomen revealed cirrhosis and portal hypertension\par \par

## 2020-08-24 LAB
ALBUMIN SERPL ELPH-MCNC: 4.3 G/DL
ALP BLD-CCNC: 76 U/L
ALT SERPL-CCNC: 15 U/L
ANION GAP SERPL CALC-SCNC: 11 MMOL/L
AST SERPL-CCNC: 15 U/L
BILIRUB DIRECT SERPL-MCNC: 0.3 MG/DL
BILIRUB INDIRECT SERPL-MCNC: 0.9 MG/DL
BILIRUB SERPL-MCNC: 1.2 MG/DL
BUN SERPL-MCNC: 16 MG/DL
CALCIUM SERPL-MCNC: 9.1 MG/DL
CHLORIDE SERPL-SCNC: 107 MMOL/L
CO2 SERPL-SCNC: 23 MMOL/L
CREAT SERPL-MCNC: 1 MG/DL
GLUCOSE SERPL-MCNC: 109 MG/DL
POTASSIUM SERPL-SCNC: 4 MMOL/L
PROT SERPL-MCNC: 6.7 G/DL
SODIUM SERPL-SCNC: 141 MMOL/L

## 2020-08-25 DIAGNOSIS — D69.6 THROMBOCYTOPENIA, UNSPECIFIED: ICD-10-CM

## 2020-08-31 LAB
AFPL3 RESULTS RECEIVED: NORMAL
ALPHA-1-FETOPROTEIN-L3: NORMAL %
ALPHA-1-FETOPROTEIN: 2.5 NG/ML

## 2020-09-09 ENCOUNTER — OUTPATIENT (OUTPATIENT)
Dept: OUTPATIENT SERVICES | Facility: HOSPITAL | Age: 60
LOS: 1 days | Discharge: HOME | End: 2020-09-09
Payer: MEDICAID

## 2020-09-09 DIAGNOSIS — R10.9 UNSPECIFIED ABDOMINAL PAIN: ICD-10-CM

## 2020-09-09 DIAGNOSIS — K82.9 DISEASE OF GALLBLADDER, UNSPECIFIED: Chronic | ICD-10-CM

## 2020-09-09 DIAGNOSIS — Z76.89 PERSONS ENCOUNTERING HEALTH SERVICES IN OTHER SPECIFIED CIRCUMSTANCES: Chronic | ICD-10-CM

## 2020-09-09 DIAGNOSIS — H26.9 UNSPECIFIED CATARACT: Chronic | ICD-10-CM

## 2020-09-09 DIAGNOSIS — Z96.641 PRESENCE OF RIGHT ARTIFICIAL HIP JOINT: Chronic | ICD-10-CM

## 2020-09-09 PROCEDURE — 74183 MRI ABD W/O CNTR FLWD CNTR: CPT | Mod: 26

## 2020-09-20 NOTE — ASU PREOP CHECKLIST - IDENTIFICATION BAND VERIFIED
done [FreeTextEntry1] : Estrellita was evaluated at the cardiology office at the [facility] on September 16, 2020. She is now a 1 year 5-month-old toddler with a prenatal diagnosis of Marfan syndrome.  Her last cardiac evaluation was in March 2020.\par \par She was accompanied to the office visit today by her mother.   I spoke to her father, Dr. Nation (PhD in [employer]), via telephone, to update him on Estrellita's cardiac status.  The parents are .\par \par Neither Estrellita, nor any of her immediate family members, have had any signs or symptoms of COVID-19.  No one in her family has tested positive for coronavirus 2 (SARS–CoV-2).\par \par Family history is notable in that Estrellita's father, Mr. Roscoe Nation, was diagnosed with Marfan syndrome, by Dr. Chetan Sandy, at age 13 years while living in [place]. Mr. Nation had a valve sparing aortic root replacement by Dr. Fairbanks at [place] on June 6, 2005. He also had ectopia lentis.  Mr. Nation represented a new, spontaneous mutation in his family. No other family members carry the diagnosis of Marfan syndrome.  In March of 2020, Dr. Nation informed me that he has had multiple cardiovascular issues since October 2018.  He has a stable thoracoabdominal aortic dissection.  On April 18, 2019, he underwent open heart surgery by Dr. Adryan [facility], at [facility], which included mitral valve repair and an aortic valve replacement.  He is currently stable and followed by Dr. Rajan at [facility].\par \par Birth history: Estrellita was the 7 pound product of a term gestation. The pregnancy was complicated by gestational hypertension. A prenatal diagnosis of Marfan syndrome was established via [facility] genetic testing:\par Genetics on [facility] (fetus: Estrellita Nation):\par Heterozygous for the [id number]>A (p.*). Pathogenic variant in exon 64 of the FBN1 gene.\par Estrellita's father (Roscoe Nation) has this same genetic mutation. Mr. Nation has clinical Marfan syndrome.\par \par Estrellita has been doing quite well at home with no symptoms referable to the cardiovascular system. She is eating well. She has no easy fatigability, respiratory distress,or excessive irritability. She is a tall child. She has had normal weight gain and is achieving her developmental milestones.  \par \par On July 20, 2018, Estrellita was evaluated by Dr. Kaplan of pediatric ophthalmology. She was found to have no evidence of ectopia lentis.  Her last evaluation in pediatric ophthalmology on August 12, 2019. She has a pediatric ophthalmology follow-up on October 14, 2020.  Annual follow-up is recommended\par \par Her chronic cardiac medication is Losartan 25 mg once daily (1.8 mg/kg/day).  She is tolerating this dose of losartan well.\par \par On July 15, 2019, Estrellita was evaluated in pediatric orthopedics and was diagnosed with bilateral pes planovalgus. It was recommended that she be fitted for orthotics and a leg brace, which she is presently wearing.  Her last evaluation in pediatric orthopedics was on August 17, 2020.\par \par She has no known allergies and her immunizations are up to date.  She is attending an in person  3 days/week.  A review of systems was otherwise unremarkable.\par \par Estrellita has 3 half sisters (same mother) who are in good health. Estrellita is an only child for her father.

## 2020-09-21 NOTE — ASU PATIENT PROFILE, ADULT - PSH
Patient calls requesting work note for today. Writer spoke with austyn Amezquita to provide note seen today. Patient informed of same. Work note placed at PSR desk.     Patient also questions if she can get a prescription for a probiotic. She states her insurance will not cover since it is available over the counter and patient cannot afford this.  Please advise.    Capsular cataract of right eye    Encounter for cholecystectomy    Gall bladder disease  LAP STEPHANIE  S/P hip replacement, right

## 2020-09-30 ENCOUNTER — APPOINTMENT (OUTPATIENT)
Dept: ORTHOPEDIC SURGERY | Facility: CLINIC | Age: 60
End: 2020-09-30

## 2020-09-30 ENCOUNTER — OUTPATIENT (OUTPATIENT)
Dept: OUTPATIENT SERVICES | Facility: HOSPITAL | Age: 60
LOS: 1 days | Discharge: HOME | End: 2020-09-30

## 2020-09-30 DIAGNOSIS — Z96.641 PRESENCE OF RIGHT ARTIFICIAL HIP JOINT: ICD-10-CM

## 2020-09-30 DIAGNOSIS — K82.9 DISEASE OF GALLBLADDER, UNSPECIFIED: Chronic | ICD-10-CM

## 2020-09-30 DIAGNOSIS — H26.9 UNSPECIFIED CATARACT: Chronic | ICD-10-CM

## 2020-09-30 DIAGNOSIS — Z96.641 PRESENCE OF RIGHT ARTIFICIAL HIP JOINT: Chronic | ICD-10-CM

## 2020-09-30 DIAGNOSIS — Z76.89 PERSONS ENCOUNTERING HEALTH SERVICES IN OTHER SPECIFIED CIRCUMSTANCES: Chronic | ICD-10-CM

## 2020-09-30 DIAGNOSIS — Z96.649 PRESENCE OF UNSPECIFIED ARTIFICIAL HIP JOINT: ICD-10-CM

## 2020-10-21 ENCOUNTER — OUTPATIENT (OUTPATIENT)
Dept: OUTPATIENT SERVICES | Facility: HOSPITAL | Age: 60
LOS: 1 days | Discharge: HOME | End: 2020-10-21

## 2020-10-21 ENCOUNTER — APPOINTMENT (OUTPATIENT)
Dept: ORTHOPEDIC SURGERY | Facility: CLINIC | Age: 60
End: 2020-10-21

## 2020-10-21 DIAGNOSIS — Z76.89 PERSONS ENCOUNTERING HEALTH SERVICES IN OTHER SPECIFIED CIRCUMSTANCES: Chronic | ICD-10-CM

## 2020-10-21 DIAGNOSIS — K82.9 DISEASE OF GALLBLADDER, UNSPECIFIED: Chronic | ICD-10-CM

## 2020-10-21 DIAGNOSIS — Z96.641 PRESENCE OF RIGHT ARTIFICIAL HIP JOINT: Chronic | ICD-10-CM

## 2020-10-21 DIAGNOSIS — H26.9 UNSPECIFIED CATARACT: Chronic | ICD-10-CM

## 2020-11-28 NOTE — HISTORY OF PRESENT ILLNESS
[de-identified] : 59 yo man with extensive history of ETOH use, hepatomegaly and splenomegaly on sono done in 2018; has anemia and mild thrombocytopenia since at least 2016; noted to have hepatic steatosis in 2016 on sono and ct scan; denies mucocutaneous bleeding\par \par  No

## 2020-12-15 ENCOUNTER — APPOINTMENT (OUTPATIENT)
Dept: HEMATOLOGY ONCOLOGY | Facility: CLINIC | Age: 60
End: 2020-12-15
Payer: MEDICAID

## 2020-12-15 ENCOUNTER — OUTPATIENT (OUTPATIENT)
Dept: OUTPATIENT SERVICES | Facility: HOSPITAL | Age: 60
LOS: 1 days | Discharge: HOME | End: 2020-12-15

## 2020-12-15 ENCOUNTER — LABORATORY RESULT (OUTPATIENT)
Age: 60
End: 2020-12-15

## 2020-12-15 VITALS
WEIGHT: 190 LBS | HEIGHT: 71 IN | SYSTOLIC BLOOD PRESSURE: 177 MMHG | DIASTOLIC BLOOD PRESSURE: 89 MMHG | RESPIRATION RATE: 14 BRPM | HEART RATE: 68 BPM | TEMPERATURE: 97.4 F | BODY MASS INDEX: 26.6 KG/M2

## 2020-12-15 DIAGNOSIS — Z96.641 PRESENCE OF RIGHT ARTIFICIAL HIP JOINT: Chronic | ICD-10-CM

## 2020-12-15 DIAGNOSIS — Z00.00 ENCOUNTER FOR GENERAL ADULT MEDICAL EXAMINATION W/OUT ABNORMAL FINDINGS: ICD-10-CM

## 2020-12-15 DIAGNOSIS — H26.9 UNSPECIFIED CATARACT: Chronic | ICD-10-CM

## 2020-12-15 DIAGNOSIS — Z76.89 PERSONS ENCOUNTERING HEALTH SERVICES IN OTHER SPECIFIED CIRCUMSTANCES: Chronic | ICD-10-CM

## 2020-12-15 DIAGNOSIS — K82.9 DISEASE OF GALLBLADDER, UNSPECIFIED: Chronic | ICD-10-CM

## 2020-12-15 LAB
HCT VFR BLD CALC: 40.4 %
HGB BLD-MCNC: 13.6 G/DL
MCHC RBC-ENTMCNC: 30.9 PG
MCHC RBC-ENTMCNC: 33.7 G/DL
MCV RBC AUTO: 91.8 FL
PLATELET # BLD AUTO: 106 K/UL
PMV BLD: 10.7 FL
RBC # BLD: 4.4 M/UL
RBC # FLD: 13 %
WBC # FLD AUTO: 6.21 K/UL

## 2020-12-15 PROCEDURE — 99213 OFFICE O/P EST LOW 20 MIN: CPT

## 2020-12-15 NOTE — HISTORY OF PRESENT ILLNESS
[de-identified] : 59 yo man with extensive history of ETOH use, hepatomegaly and splenomegaly on sono done in 2018; has anemia and mild thrombocytopenia since at least 2016; noted to have hepatic steatosis in 2016 on sono and ct scan; denies mucocutaneous bleeding\par \par MRI abdomen revealed cirrhosis and portal hypertension\par \par  [de-identified] : 12/15/2020\par Patient is here for follow up today for thrombocytopenia. He feels well. His platelet count today is stable at 106K. He denies any bleed episodes, no easy bruising, no epistaxis, no gum bleeding, no blood in his urine or stool.

## 2020-12-15 NOTE — ASSESSMENT
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 Hemoglobin is stable at 13.8. \par - He needs to f/u GI/ hepatology; he should have EGD/colonoscopy to r/o esophageal varices /hemorrhoids\par - MRI abdomen was requested; never done; GI needs to follow \par \par RTC in 1 year, cbc prior to next visit

## 2020-12-17 DIAGNOSIS — D69.6 THROMBOCYTOPENIA, UNSPECIFIED: ICD-10-CM

## 2020-12-23 ENCOUNTER — OUTPATIENT (OUTPATIENT)
Dept: OUTPATIENT SERVICES | Facility: HOSPITAL | Age: 60
LOS: 1 days | Discharge: HOME | End: 2020-12-23

## 2020-12-23 ENCOUNTER — APPOINTMENT (OUTPATIENT)
Dept: ORTHOPEDIC SURGERY | Facility: CLINIC | Age: 60
End: 2020-12-23

## 2020-12-23 VITALS
DIASTOLIC BLOOD PRESSURE: 86 MMHG | HEIGHT: 71 IN | BODY MASS INDEX: 26.85 KG/M2 | HEART RATE: 61 BPM | WEIGHT: 191.8 LBS | SYSTOLIC BLOOD PRESSURE: 146 MMHG

## 2020-12-23 DIAGNOSIS — Z76.89 PERSONS ENCOUNTERING HEALTH SERVICES IN OTHER SPECIFIED CIRCUMSTANCES: Chronic | ICD-10-CM

## 2020-12-23 DIAGNOSIS — Z96.641 PRESENCE OF RIGHT ARTIFICIAL HIP JOINT: Chronic | ICD-10-CM

## 2020-12-23 DIAGNOSIS — H26.9 UNSPECIFIED CATARACT: Chronic | ICD-10-CM

## 2020-12-23 DIAGNOSIS — K82.9 DISEASE OF GALLBLADDER, UNSPECIFIED: Chronic | ICD-10-CM

## 2020-12-24 DIAGNOSIS — M12.9 ARTHROPATHY, UNSPECIFIED: ICD-10-CM

## 2021-03-23 ENCOUNTER — LABORATORY RESULT (OUTPATIENT)
Age: 61
End: 2021-03-23

## 2021-03-23 ENCOUNTER — APPOINTMENT (OUTPATIENT)
Dept: HEMATOLOGY ONCOLOGY | Facility: CLINIC | Age: 61
End: 2021-03-23
Payer: MEDICAID

## 2021-03-23 ENCOUNTER — OUTPATIENT (OUTPATIENT)
Dept: OUTPATIENT SERVICES | Facility: HOSPITAL | Age: 61
LOS: 1 days | Discharge: HOME | End: 2021-03-23

## 2021-03-23 VITALS
DIASTOLIC BLOOD PRESSURE: 78 MMHG | HEIGHT: 70 IN | HEART RATE: 65 BPM | WEIGHT: 170 LBS | TEMPERATURE: 97.7 F | SYSTOLIC BLOOD PRESSURE: 107 MMHG | BODY MASS INDEX: 24.34 KG/M2

## 2021-03-23 DIAGNOSIS — Z76.89 PERSONS ENCOUNTERING HEALTH SERVICES IN OTHER SPECIFIED CIRCUMSTANCES: Chronic | ICD-10-CM

## 2021-03-23 DIAGNOSIS — D69.6 THROMBOCYTOPENIA, UNSPECIFIED: ICD-10-CM

## 2021-03-23 DIAGNOSIS — H26.9 UNSPECIFIED CATARACT: Chronic | ICD-10-CM

## 2021-03-23 DIAGNOSIS — K82.9 DISEASE OF GALLBLADDER, UNSPECIFIED: Chronic | ICD-10-CM

## 2021-03-23 DIAGNOSIS — Z96.641 PRESENCE OF RIGHT ARTIFICIAL HIP JOINT: Chronic | ICD-10-CM

## 2021-03-23 PROCEDURE — 99212 OFFICE O/P EST SF 10 MIN: CPT

## 2021-03-23 NOTE — ASSESSMENT
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 Hemoglobin is stable at 13.8. \par - He needs to f/u GI/ hepatology; he should have EGD/colonoscopy to r/o esophageal varices /hemorrhoids\par - MRI abdomen was requested; never done; GI needs to follow \par \par Not clear what the question is today since i advised to RTC in 1 year, cbc prior to next visit \par his platelets are decreased at >100K, due to liver disease, stable count and asymptomatic

## 2021-03-23 NOTE — HISTORY OF PRESENT ILLNESS
[de-identified] : 59 yo man with extensive history of ETOH use, hepatomegaly and splenomegaly on sono done in 2018; has anemia and mild thrombocytopenia since at least 2016; noted to have hepatic steatosis in 2016 on sono and ct scan; denies mucocutaneous bleeding\par \par MRI abdomen revealed cirrhosis and portal hypertension\par \par  [de-identified] : 12/15/2020\par Patient is here for follow up today for thrombocytopenia. He feels well. His platelet count today is stable at 106K. He denies any bleed episodes, no easy bruising, no epistaxis, no gum bleeding, no blood in his urine or stool.

## 2021-03-24 LAB
ALBUMIN SERPL ELPH-MCNC: 4.4 G/DL
ALP BLD-CCNC: 63 U/L
ALT SERPL-CCNC: 23 U/L
ANION GAP SERPL CALC-SCNC: 9 MMOL/L
AST SERPL-CCNC: 20 U/L
BILIRUB DIRECT SERPL-MCNC: 0.2 MG/DL
BILIRUB INDIRECT SERPL-MCNC: 1 MG/DL
BILIRUB SERPL-MCNC: 1.2 MG/DL
BUN SERPL-MCNC: 11 MG/DL
CALCIUM SERPL-MCNC: 9.3 MG/DL
CHLORIDE SERPL-SCNC: 105 MMOL/L
CO2 SERPL-SCNC: 26 MMOL/L
CREAT SERPL-MCNC: 1.1 MG/DL
GLUCOSE SERPL-MCNC: 98 MG/DL
HCT VFR BLD CALC: 43.9 %
HGB BLD-MCNC: 14.5 G/DL
MCHC RBC-ENTMCNC: 31.2 PG
MCHC RBC-ENTMCNC: 33 G/DL
MCV RBC AUTO: 94.4 FL
PLATELET # BLD AUTO: 105 K/UL
PMV BLD: 11 FL
POTASSIUM SERPL-SCNC: 4.3 MMOL/L
PROT SERPL-MCNC: 7.1 G/DL
RBC # BLD: 4.65 M/UL
RBC # FLD: 13.7 %
SODIUM SERPL-SCNC: 140 MMOL/L
WBC # FLD AUTO: 5.95 K/UL

## 2021-03-26 DIAGNOSIS — D69.6 THROMBOCYTOPENIA, UNSPECIFIED: ICD-10-CM

## 2021-03-31 ENCOUNTER — APPOINTMENT (OUTPATIENT)
Dept: HEMATOLOGY ONCOLOGY | Facility: CLINIC | Age: 61
End: 2021-03-31

## 2022-01-21 NOTE — H&P PST ADULT - PULMONARY EMBOLUS
The previously placed swab order would be if he was swabbed here in the office  It would be better to do post virtual visit order that way he can schedule it at the lab directly via the [facility]  Order placed   no

## 2022-02-08 ENCOUNTER — APPOINTMENT (OUTPATIENT)
Dept: HEMATOLOGY ONCOLOGY | Facility: CLINIC | Age: 62
End: 2022-02-08

## 2022-03-28 NOTE — ASU PATIENT PROFILE, ADULT - CENTRAL VENOUS CATHETER
----- Message from Trung Collins MD sent at 3/28/2022  6:12 AM CDT -----  Notify patient of positive urine culture.  Treat with Bactrim DS twice a day x7 days.   no

## 2022-12-10 NOTE — ASU PATIENT PROFILE, ADULT - PAIN LOCATION, PROFILE
..  End of Shift Note    Bedside shift change report given to 17[address] Ned Leonard (oncoming nurse) by Mildred Cortez RN (offgoing nurse). Report included the following information SBAR, Kardex, Intake/Output, MAR, and Recent Results    Shift worked:  7p - 7a   Shift summary and any significant changes:    New Admit     Concerns for physician to address: [address] [facility] sharing.it phone for oncoming shift:  [phone]     Patient Information  Adalgisa Leblanc  78 y.o.  12/9/2022 12:45 PM by Dieudonne Vázquez MD. Adalgisa Leblanc was admitted from Home    Problem List  Patient Active Problem List    Diagnosis Date Noted    Stroke (cerebrum) ([facility] [address] 12/09/2022    Meningitis 11/01/2022    High fever 11/01/2022    Accelerated hypertension 11/01/2022    Mixed hyperlipidemia 10/04/2021    Carotid stenosis, asymptomatic, right 12/16/2020    Carotid stenosis, symptomatic w/o infarct, left 11/02/2020    Bilateral carotid artery stenosis 10/19/2020    TIA (transient ischemic attack) 10/17/2020    High cholesterol 09/21/2020    Essential hypertension 12/28/2018    Coronary artery disease involving native coronary artery of native heart without angina pectoris 06/12/2018    S/P coronary artery stent placement 05/25/2018    Hx of diverticulitis of colon 05/18/2018    Gout      Past Medical History:   Diagnosis Date    CAD (coronary artery disease)     Diverticulitis     Glaucoma     Gout     HTN (hypertension) 5/18/2018    Hx of diverticulitis of colon 5/18/2018    Hyperlipidemia     Hypertension     Other ill-defined conditions(799.89)     high cholesterol    S/P coronary artery stent placement 05/25/2018    Stroke ([facility] [address] 10/2020    TIA       Core Measures:  CVA: Yes Yes  CHF:No No  PNA:No No    Activity:  Activity Level: Up with Assistance  Number times ambulated in hallways past shift: 0  Number of times OOB to chair past shift: 0    Cardiac:   Cardiac Monitoring: Yes           Access:   Current line(s): PIV   Central Line?  No     Genitourinary: Urinary status: external catheter  Urinary Catheter? No     Respiratory:   O2 Device: None (Room air)  Chronic home O2 use?: NO  Incentive spirometer at bedside: NO       GI:  Last Bowel Movement Date: 12/09/22  Current diet:  ADULT DIET Regular  Passing flatus: YES  Tolerating current diet: YES       Pain Management:   Patient states pain is manageable on current regimen: YES    Skin:  Stevenson Score: 19  Interventions: PT/OT consult, internal/external urinary devices, and nutritional support     Patient Safety:  Fall Score:  Total Score: 2  Interventions: bed/chair alarm and gripper socks     @Rollbelt  @dexterity to release roll belt  Yes/No ( must document dexterity  here by stating Yes or No here, otherwise this is a restraint and must follow restraint documentation policy.)    DVT prophylaxis:  DVT prophylaxis Med- No  DVT prophylaxis SCD or GABO- No     Wounds: (If Applicable)  Wounds- No  Location     Active Consults:  IP CONSULT TO HOSPITALIST  IP CONSULT TO NEUROLOGY  IP CONSULT TO NEUROSURGERY    Length of Stay:  Expected LOS: - - -  Actual LOS: 1  Discharge Plan: Yes Everardo Paredes RN back/leg/lower

## 2023-09-07 NOTE — ED PROVIDER NOTE - PROGRESS NOTE DETAILS
Anticoagulation Summary  As of 2023    INR goal:  2.0-3.0   TTR:  71.8 % (10.5 y)   INR used for dosin.8 (2023)   Warfarin maintenance plan:  5 mg (5 mg x 1) every day   Weekly warfarin total:  35 mg   Plan last modified:  Elaina Augustine RN (2023)   Next INR check:  2023   Priority:  Follow-Up - 2 Weeks   Target end date: Indefinite    Indications    Chronic atrial fibrillation (CMD) [I48.20]  Encounter for monitoring Coumadin therapy [Z51.81  Z79.01]  Ischemic cardiomyopathy [I25.5]  Automatic implantable cardioverter-defibrillator Medtronic [Z95.810]  Long term current use of anticoagulant [Z79.01]             Anticoagulation Episode Summary     INR check location:  Anticoagulation Clinic    Preferred lab:      Send INR reminders to:  HEIDY Camara (Angelita Moors)    Comments:  [facility] [address]us [address]      Anticoagulation Care Providers     Provider Role Specialty Phone number    Krystin Rutledge MD Referring Internal Medicine [phone]    Tania Armenta DO Responsible Cardiovascular Disease [phone]          Assessment  Yes No   []  [x] Missed doses:   []  [x] Extra doses:   [x]  [] Significant medication changes (RX, OTC, Herbal):   [x]  [] High-risk maintenance medications:                []  [x] Vitamin K / dietary changes    []  [x] Bleeding / bruising:   []  [x] Falls / injury:   []  [x] Acute illness:    []  [x] Alcohol intake:   []  [x] Procedures / hospitalization / ER visits:   []  [x] Other:    Spoke with patient via phone regarding anticoagulation monitoring. Last INR on 23 was 3.2. Dose maintained. Today's INR is 2.8 and is within goal range of 2.0-3.0. Patient took dexAMETHasone for 2 days, on  and . Per Up to Date this can increase INR. Current warfarin total weekly dose of 35 mg verified. Informed the INR result is within therapeutic range and instructed to maintain current dose per protocol.  Discussed dose and return date of 9/27/23 for next INR. See Anticoagulation flowsheet. Dr. Stephenie Evans is in the office today supervising the treatment. Instructed to contact the clinic with any unusual bleeding or bruising, any changes in medications, diet, health status, lifestyle, or any other changes, questions or concerns. Verbalized understanding of all discussed. Pt feeling much better Pt walks at home with walker, pt given walker in the ED, able to ambulate without difficulty Spoke to [facility], discussed wound care. Discussed need to follow up with PMD and ortho Discussed with care for wound. Discussed signs and symptoms to return to the ED for. Pt understands and agrees with plan

## 2023-12-29 NOTE — PHYSICAL EXAM
[Restricted in physically strenuous activity but ambulatory and able to carry out work of a light or sedentary nature] : Status 1- Restricted in physically strenuous activity but ambulatory and able to carry out work of a light or sedentary nature, e.g., light house work, office work [Normal] : affect appropriate MED

## 2024-01-03 NOTE — ED PROVIDER NOTE - NS_EDPROVIDERDISPOUSERTYPE_ED_A_ED
discussing pathophys of the malacia, relationship to TEF Attending Attestation (For Attendings USE Only)... carefully reviewing all applicable data (including laboratory tests, imaging studies, etc), examining the patient, formulating a management plan, and discussing the plan in detail with the primary team. Patient seen and examined at the bedside. I reviewed and edited the entire body of the note above so that it reflects my personal, face-to-face involvement in all specified aspects of the patient's care.

## 2024-03-14 ENCOUNTER — INPATIENT (INPATIENT)
Facility: HOSPITAL | Age: 64
LOS: 3 days | Discharge: NURSING, REHAB OR LONG-TERM CARE | DRG: 884 | End: 2024-03-18
Attending: INTERNAL MEDICINE | Admitting: INTERNAL MEDICINE
Payer: MEDICARE

## 2024-03-14 VITALS
HEART RATE: 78 BPM | OXYGEN SATURATION: 98 % | WEIGHT: 184.97 LBS | DIASTOLIC BLOOD PRESSURE: 67 MMHG | SYSTOLIC BLOOD PRESSURE: 137 MMHG | HEIGHT: 68 IN | RESPIRATION RATE: 18 BRPM | TEMPERATURE: 98 F

## 2024-03-14 DIAGNOSIS — K82.9 DISEASE OF GALLBLADDER, UNSPECIFIED: Chronic | ICD-10-CM

## 2024-03-14 DIAGNOSIS — Z76.89 PERSONS ENCOUNTERING HEALTH SERVICES IN OTHER SPECIFIED CIRCUMSTANCES: Chronic | ICD-10-CM

## 2024-03-14 DIAGNOSIS — Z96.641 PRESENCE OF RIGHT ARTIFICIAL HIP JOINT: Chronic | ICD-10-CM

## 2024-03-14 DIAGNOSIS — H26.9 UNSPECIFIED CATARACT: Chronic | ICD-10-CM

## 2024-03-14 LAB
ALBUMIN SERPL ELPH-MCNC: 4.2 G/DL — SIGNIFICANT CHANGE UP (ref 3.5–5.2)
ALP SERPL-CCNC: 66 U/L — SIGNIFICANT CHANGE UP (ref 30–115)
ALT FLD-CCNC: 17 U/L — SIGNIFICANT CHANGE UP (ref 0–41)
AMMONIA BLD-MCNC: 18 UMOL/L — SIGNIFICANT CHANGE UP (ref 11–55)
ANION GAP SERPL CALC-SCNC: 11 MMOL/L — SIGNIFICANT CHANGE UP (ref 7–14)
APAP SERPL-MCNC: <5 UG/ML — LOW (ref 10–30)
AST SERPL-CCNC: 18 U/L — SIGNIFICANT CHANGE UP (ref 0–41)
BASOPHILS # BLD AUTO: 0.02 K/UL — SIGNIFICANT CHANGE UP (ref 0–0.2)
BASOPHILS NFR BLD AUTO: 0.4 % — SIGNIFICANT CHANGE UP (ref 0–1)
BILIRUB SERPL-MCNC: 1.6 MG/DL — HIGH (ref 0.2–1.2)
BUN SERPL-MCNC: 20 MG/DL — SIGNIFICANT CHANGE UP (ref 10–20)
CALCIUM SERPL-MCNC: 9.6 MG/DL — SIGNIFICANT CHANGE UP (ref 8.4–10.4)
CHLORIDE SERPL-SCNC: 107 MMOL/L — SIGNIFICANT CHANGE UP (ref 98–110)
CO2 SERPL-SCNC: 27 MMOL/L — SIGNIFICANT CHANGE UP (ref 17–32)
CREAT SERPL-MCNC: 1.2 MG/DL — SIGNIFICANT CHANGE UP (ref 0.7–1.5)
EGFR: 67 ML/MIN/1.73M2 — SIGNIFICANT CHANGE UP
EOSINOPHIL # BLD AUTO: 0.05 K/UL — SIGNIFICANT CHANGE UP (ref 0–0.7)
EOSINOPHIL NFR BLD AUTO: 0.9 % — SIGNIFICANT CHANGE UP (ref 0–8)
ETHANOL SERPL-MCNC: <10 MG/DL — SIGNIFICANT CHANGE UP
FLUAV AG NPH QL: SIGNIFICANT CHANGE UP
FLUBV AG NPH QL: SIGNIFICANT CHANGE UP
GLUCOSE SERPL-MCNC: 98 MG/DL — SIGNIFICANT CHANGE UP (ref 70–99)
HCT VFR BLD CALC: 38 % — LOW (ref 42–52)
HGB BLD-MCNC: 12.6 G/DL — LOW (ref 14–18)
IMM GRANULOCYTES NFR BLD AUTO: 0.4 % — HIGH (ref 0.1–0.3)
LYMPHOCYTES # BLD AUTO: 0.86 K/UL — LOW (ref 1.2–3.4)
LYMPHOCYTES # BLD AUTO: 15.8 % — LOW (ref 20.5–51.1)
MCHC RBC-ENTMCNC: 31.7 PG — HIGH (ref 27–31)
MCHC RBC-ENTMCNC: 33.2 G/DL — SIGNIFICANT CHANGE UP (ref 32–37)
MCV RBC AUTO: 95.5 FL — HIGH (ref 80–94)
MONOCYTES # BLD AUTO: 0.45 K/UL — SIGNIFICANT CHANGE UP (ref 0.1–0.6)
MONOCYTES NFR BLD AUTO: 8.3 % — SIGNIFICANT CHANGE UP (ref 1.7–9.3)
NEUTROPHILS # BLD AUTO: 4.03 K/UL — SIGNIFICANT CHANGE UP (ref 1.4–6.5)
NEUTROPHILS NFR BLD AUTO: 74.2 % — SIGNIFICANT CHANGE UP (ref 42.2–75.2)
NRBC # BLD: 0 /100 WBCS — SIGNIFICANT CHANGE UP (ref 0–0)
PLATELET # BLD AUTO: 123 K/UL — LOW (ref 130–400)
PMV BLD: 11.9 FL — HIGH (ref 7.4–10.4)
POTASSIUM SERPL-MCNC: 4.1 MMOL/L — SIGNIFICANT CHANGE UP (ref 3.5–5)
POTASSIUM SERPL-SCNC: 4.1 MMOL/L — SIGNIFICANT CHANGE UP (ref 3.5–5)
PROT SERPL-MCNC: 6.6 G/DL — SIGNIFICANT CHANGE UP (ref 6–8)
RBC # BLD: 3.98 M/UL — LOW (ref 4.7–6.1)
RBC # FLD: 13.8 % — SIGNIFICANT CHANGE UP (ref 11.5–14.5)
RSV RNA NPH QL NAA+NON-PROBE: SIGNIFICANT CHANGE UP
SALICYLATES SERPL-MCNC: <0.3 MG/DL — LOW (ref 4–30)
SARS-COV-2 RNA SPEC QL NAA+PROBE: SIGNIFICANT CHANGE UP
SODIUM SERPL-SCNC: 145 MMOL/L — SIGNIFICANT CHANGE UP (ref 135–146)
WBC # BLD: 5.43 K/UL — SIGNIFICANT CHANGE UP (ref 4.8–10.8)
WBC # FLD AUTO: 5.43 K/UL — SIGNIFICANT CHANGE UP (ref 4.8–10.8)

## 2024-03-14 PROCEDURE — 99285 EMERGENCY DEPT VISIT HI MDM: CPT

## 2024-03-14 NOTE — ED PROVIDER NOTE - ATTENDING CONTRIBUTION TO CARE
63-year-old male past medical history of COPD, acid reflux, sciatica, history of alcohol misuse disorder currently not drinking, schizophrenia, hypertension currently residing in [facility], [place] sent from the nursing home for evaluation of aggression and paranoia.  I spoke with nursing home nurse, Elnea Webb, who told me that for the past week patient has been more paranoid, wanting to go to the bank to get money, stating that he wants to hit /fight with somebody.  He was evaluated by nursing home psychiatrist who increased his dose of Seroquel from 25 to 50 mg today.  This did not change his behavior and prompted ED visit.  As per the nurse, he has not assaulted anyone physically since symptoms started, but they had to keep him away from other residents. .  Patient appears slightly older than stated age, resting on a stretcher, does not appear to be in acute distress, head atraumatic/normocephalic, PERRL, poor dentition with multiple missing teeth, MMM, neck is supple without meningeal signs, no midline cervical spine tenderness to palpation, speaking full sentences, equal air entry bilaterally, no wheezing or rhonchi (poor inspiratory effort on the patient's part limits exam), RRR, well-perfused extremities, abdomen soft/NT/ND, moving all extremities equally, good handgrip, he follows simple directions, but appears to be confused as to place and time.  Does not answer questions appropriately and seems internally preoccupied. Plan: Labs, chest x-ray, will reach out to psychiatry.

## 2024-03-14 NOTE — ED ADULT TRIAGE NOTE - CHIEF COMPLAINT QUOTE
Pt BIBA from [facility] c/o of lower back and hip pain per patient. Per EMS NH called as patient has been slightly more paranoid.

## 2024-03-14 NOTE — ED PROVIDER NOTE - WR INTERPRETATION DATE TIME  1
Problem: Pain  Goal: Verbalizes/displays adequate comfort level or baseline comfort level  Outcome: Adequate for Discharge     Problem: Discharge Planning  Goal: Discharge to home or other facility with appropriate resources  Outcome: Adequate for Discharge     Problem: ABCDS Injury Assessment  Goal: Absence of physical injury  Outcome: Adequate for Discharge     Problem: Safety - Adult  Goal: Free from fall injury  Outcome: Adequate for Discharge 14-Mar-2024 16:01

## 2024-03-14 NOTE — ED PROVIDER NOTE - WR INTERPRETED BY 1
"Anesthesia Transfer of Care Note    Patient: Chad Chen    Procedure(s) Performed: Procedure(s) (LRB):  RELEASE, CARPAL TUNNEL (Left)    Patient location: PACU    Anesthesia Type: general    Transport from OR: Transported from OR on room air with adequate spontaneous ventilation    Post pain: adequate analgesia    Post assessment: no apparent anesthetic complications    Post vital signs: stable    Level of consciousness: awake and sedated    Nausea/Vomiting: no nausea/vomiting    Complications: none    Transfer of care protocol was followed      Last vitals:   Visit Vitals  BP (!) 148/89 (BP Location: Right arm, Patient Position: Lying)   Pulse 65   Temp 36.3 °C (97.3 °F) (Skin)   Resp (!) 96   Ht 5' 11.5" (1.816 m)   Wt 127 kg (280 lb)   SpO2 96%   BMI 38.51 kg/m²     " Amalia Robbins
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
## 2024-03-14 NOTE — ED PROVIDER NOTE - CLINICAL SUMMARY MEDICAL DECISION MAKING FREE TEXT BOX
Patient evaluated in ED for paranoia, evaluated by telepsychiatry in ED after medically cleared.  Recommended hold in the ED for a.m. reassessment.  Due to hold patient placed in EDOU for continued monitoring and further evaluation and treatment.

## 2024-03-14 NOTE — ED PROVIDER NOTE - OBJECTIVE STATEMENT
Patient is a 63-year-old male, past medical history of COPD, GERD, sciatica, history of alcohol misuse disorder, schizophrenia, hypertension, comes in from [facility] for evaluation of aggression and paranoia.  Nursing staff states that for the past week patient has been more paranoid and wanting to go to the bank to get money.  Also verbalized but did not act out not wanting to fight with another individual.  Nursing home psychiatrist increased his dose of Seroquel from 25 to 50 mg today, but no change in forementioned behavior.  Patient is poor historian unable to obtain clear history.

## 2024-03-14 NOTE — ED BEHAVIORAL HEALTH ASSESSMENT NOTE - PREPARATORY ACTS:
To be completed in Nursing note:    Please reference list for forms that require a visit for completion.  Please remind patients that providers are given 3-5 business days to complete and return forms.      Form type:  Home Health Care:  Revision To Plan of Care from 2022 - 2022    Date form received: 2022     Date form completed by Physician:  2022    How was form returned to patient (mailed, faxed, or at  for patient to ):  fax to [phone]       Date form mailed/faxed/left at  for patient and sent to HIM for [email or web address]         Once form is left for patient, faxed, or mailed PCS will then close the documentation only encounter.     
Unable to assess

## 2024-03-14 NOTE — ED BEHAVIORAL HEALTH ASSESSMENT NOTE - OTHER PAST PSYCHIATRIC HISTORY (INCLUDE DETAILS REGARDING ONSET, COURSE OF ILLNESS, INPATIENT/OUTPATIENT TREATMENT)
Details unclear, h/o psychosis per [facility]. On dementia unit.  Per staff, was on Seroquel 25mg which was inc to 50mg today.

## 2024-03-14 NOTE — ED BEHAVIORAL HEALTH ASSESSMENT NOTE - CURRENT MEDICATION
Details unclear, unable to get list from NH currently.  Per ED Note: Pantoprazole 40mg daily, Senna, Colage, Enalapril 20mg daily, Labeetolol 100mg BID, Tylenol, Lactulose 30md daily, Lexapro 20mg daily, Xifaxan 550mg BID (no comment re Seroquel on that list).  Should be clarified with residence
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
## 2024-03-14 NOTE — ED ADULT NURSE NOTE - CHIEF COMPLAINT QUOTE
Pt BIBA from [facility] c/o of lower back and hip pain per patient. Per EMS NH called as patient has been slightly more paranoid.

## 2024-03-14 NOTE — ED BEHAVIORAL HEALTH ASSESSMENT NOTE - NSBHATTESTAPPAMEND_PSY_A_CORE
I have personally seen and examined this patient. I fully participated in the care of this patient. I have made amendments to the documentation where appropriate and otherwise agree with the history, physical exam, and plan as documented by the CHUN
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
## 2024-03-14 NOTE — ED BEHAVIORAL HEALTH ASSESSMENT NOTE - TIME CONSULT PERFORMED
14-Mar-2024 22:03 Oh wow... that seems surprisingly expensive. Looks like they picked it up though. I would make sure they follow up to see if it works well for them (want to make sure it is worth the price!)    Do you want to follow up or would you like me to call next week to see if the famotidine is helpful? Thanks!     Mila Brunner, Pharm.D., BCACP  Medication Therapy Management Pharmacist  [facility]

## 2024-03-14 NOTE — ED ADULT NURSE NOTE - NSFALLHARMRISKINTERV_ED_ALL_ED
Assistance OOB with selected safe patient handling equipment if applicable/Assistance with ambulation/Communicate risk of Fall with Harm to all staff, patient, and family/Monitor gait and stability/Monitor for mental status changes and reorient to person, place, and time, as needed/Provide visual cue: red socks, yellow wristband, yellow gown, etc/Reinforce activity limits and safety measures with patient and family/Toileting schedule using arm’s reach rule for commode and bathroom/Use of alarms - bed, stretcher, chair and/or video monitoring/Bed in lowest position, wheels locked, appropriate side rails in place/Call bell, personal items and telephone in reach/Instruct patient to call for assistance before getting out of bed/chair/stretcher/Non-slip footwear applied when patient is off stretcher/[place] to call system/Physically safe environment - no spills, clutter or unnecessary equipment/Purposeful Proactive Rounding/Room/bathroom lighting operational, light cord in reach

## 2024-03-14 NOTE — ED PROVIDER NOTE - PHYSICAL EXAMINATION
GENERAL: Well-developed; in no acute distress. older appearing than stated age. Does not answer questions appropriately and seems internally preoccupied.  SKIN: warm, well perfused  HEAD: Normocephalic; atraumatic.  EYES: no conjunctival erythema, ocular motions intact and appropriate  CARD: Regular rate and rhythm.   RESP: LCTAB  ABD: soft, nontender, and nondistended  Upper EXT: Normal ROM. Rad pulses 2+ symm, cap refill <2 secs, sensation intact and symm,  strength 5/5  Lower EXT: no edema, strength equal b/l

## 2024-03-14 NOTE — ED PROVIDER NOTE - DIFFERENTIAL DIAGNOSIS
Exacerbation of schizophrenia, electrolyte abnormalities, encephalopathy, infectious process such as pneumonia/UTI Differential Diagnosis

## 2024-03-14 NOTE — ED PROVIDER NOTE - PROGRESS NOTE DETAILS
.EP: According to Elena Webb, nurse at patient's nursing home, the patient has been eating and drinking well, no recent illness or trauma, there has been no vomiting, fever, cough, complaints of shortness of breath or chest pain. He sleeps poorly which is his baseline. EP: Case endorsed to Dr. Burton to follow-up with psychiatry consult, reassess and dispo. Pt s/o to me by Dr. Robbins to follow up psychiatry consult, reassess and dispo. Pt resting comfortably in NAD. Spoke with telepsychiatry attending after evaluation, recommends holding patient in ED for reassessment in a.m. for additional collateral information.  Will continue to monitor.

## 2024-03-14 NOTE — ED BEHAVIORAL HEALTH ASSESSMENT NOTE - DESCRIPTION
SOURCE:  RN, EHR review     ARRIVAL: [facility]     BELONGINGS:      None notable, stowed away.  Requested patient be placed in private room and in a gown.     BEHAVIOR: Per RN, patient presented to ED via EMS sent from nursing home due to paranoia and aggression. Per RN, patient is calm, cooperative, and remains under behavioral control. Patient’s hygiene is good, no noticeable bruises or cuts. Per RN, Patient is orientedx2 (person/place), makes good eye contact, normal thoughts and speech.  Patient is resting. Patient does not endorse SI, SA, HI, AVH.     TREATMENT: Patient received no medications or treatment.      VISITORS: Patient is unaccompanied at this time. Patient reports having friendships at nursing home and watches TV and plays card games. HTN, low back pain, dementia?

## 2024-03-15 DIAGNOSIS — F29 UNSPECIFIED PSYCHOSIS NOT DUE TO A SUBSTANCE OR KNOWN PHYSIOLOGICAL CONDITION: ICD-10-CM

## 2024-03-15 DIAGNOSIS — F22 DELUSIONAL DISORDERS: ICD-10-CM

## 2024-03-15 LAB
APPEARANCE UR: CLEAR — SIGNIFICANT CHANGE UP
BILIRUB UR-MCNC: NEGATIVE — SIGNIFICANT CHANGE UP
COLOR SPEC: YELLOW — SIGNIFICANT CHANGE UP
DIFF PNL FLD: NEGATIVE — SIGNIFICANT CHANGE UP
GLUCOSE UR QL: NEGATIVE MG/DL — SIGNIFICANT CHANGE UP
KETONES UR-MCNC: ABNORMAL MG/DL
LEUKOCYTE ESTERASE UR-ACNC: NEGATIVE — SIGNIFICANT CHANGE UP
NITRITE UR-MCNC: NEGATIVE — SIGNIFICANT CHANGE UP
PH UR: 6.5 — SIGNIFICANT CHANGE UP (ref 5–8)
PROT UR-MCNC: NEGATIVE MG/DL — SIGNIFICANT CHANGE UP
SP GR SPEC: 1.02 — SIGNIFICANT CHANGE UP (ref 1–1.03)
UROBILINOGEN FLD QL: 2 MG/DL (ref 0.2–1)

## 2024-03-15 PROCEDURE — 86803 HEPATITIS C AB TEST: CPT

## 2024-03-15 PROCEDURE — 99223 1ST HOSP IP/OBS HIGH 75: CPT

## 2024-03-15 PROCEDURE — 85025 COMPLETE CBC W/AUTO DIFF WBC: CPT

## 2024-03-15 PROCEDURE — 87635 SARS-COV-2 COVID-19 AMP PRB: CPT

## 2024-03-15 PROCEDURE — 70450 CT HEAD/BRAIN W/O DYE: CPT | Mod: 26,MC

## 2024-03-15 PROCEDURE — 36415 COLL VENOUS BLD VENIPUNCTURE: CPT

## 2024-03-15 PROCEDURE — 84100 ASSAY OF PHOSPHORUS: CPT

## 2024-03-15 PROCEDURE — 93005 ELECTROCARDIOGRAM TRACING: CPT

## 2024-03-15 PROCEDURE — 80053 COMPREHEN METABOLIC PANEL: CPT

## 2024-03-15 RX ORDER — HALOPERIDOL DECANOATE 100 MG/ML
2 INJECTION INTRAMUSCULAR ONCE
Refills: 0 | Status: COMPLETED | OUTPATIENT
Start: 2024-03-15 | End: 2024-03-15

## 2024-03-15 RX ADMIN — HALOPERIDOL DECANOATE 2 MILLIGRAM(S): 100 INJECTION INTRAMUSCULAR at 03:36

## 2024-03-15 NOTE — ED BEHAVIORAL HEALTH PROGRESS NOTE - NSBHATTESTCOMMENTATTENDFT_PSY_A_CORE
Patient has chronic cognitive impairment and is cared for in dementia unit of nursing home. On interview he is not fully oriented, quite limited in his responses, appearing somewhat frail and deconditioned. Per nursing home manager, pt had been stable psychiatrically for several years but only in the last week has shown combativeness and greater confusion. Presentation c/f possible delirium vs stepwise decline from major neurocognitive disorder.

## 2024-03-15 NOTE — ED CDU PROVIDER DISPOSITION NOTE - CLINICAL COURSE
Patient was cleared by the telepsychiatry service and felt that medical admission was more appropriate.  CT head was normal, labs unremarkable.  Will admit to medicine.  I doubt this patient has changes in behavior due to sepsis severe sepsis or other metabolic disorder this is likely worsening dementia.  Will admit to medicine.

## 2024-03-15 NOTE — ED BEHAVIORAL HEALTH PROGRESS NOTE - SUMMARY
Risk factors: Current and Past Psychiatric Diagnoses, Presenting Symptoms, Activating Events/Stressors, Mood disorder, Impulsivity, Inadequate social supports  Protective factors: Identifies reasons for living, Fear of death or the actual act of killing self

## 2024-03-15 NOTE — H&P ADULT - NSHPLABSRESULTS_GEN_ALL_CORE
LABS:                          12.6   5.43  )-----------( 123      ( 14 Mar 2024 15:55 )             38.0     03-14    145  |  107  |  20  ----------------------------<  98  4.1   |  27  |  1.2    Ca    9.6      14 Mar 2024 15:55    TPro  6.6  /  Alb  4.2  /  TBili  1.6<H>  /  DBili  x   /  AST  18  /  ALT  17  /  AlkPhos  66  03-14    LIVER FUNCTIONS - ( 14 Mar 2024 15:55 )  Alb: 4.2 g/dL / Pro: 6.6 g/dL / ALK PHOS: 66 U/L / ALT: 17 U/L / AST: 18 U/L / GGT: x             Urinalysis Basic - ( 15 Mar 2024 00:10 )    Color: Yellow / Appearance: Clear / S.020 / pH: x  Gluc: x / Ketone: Trace mg/dL  / Bili: Negative / Urobili: 2.0 mg/dL   Blood: x / Protein: Negative mg/dL / Nitrite: Negative   Leuk Esterase: Negative / RBC: x / WBC x   Sq Epi: x / Non Sq Epi: x / Bacteria: x      CT head : No acute intracranial pathology. No evidence of midline shift, mass   effect or intracranial hemorrhage.

## 2024-03-15 NOTE — H&P ADULT - NSICDXPASTMEDICALHX_GEN_ALL_CORE_FT
PAST MEDICAL HISTORY:  Acid reflux disease     Alcohol abuse LAST DRINK APPROX ONE YEAR AGO    Constipation     History of COPD     HTN (hypertension)     Schizo affective schizophrenia DEPRESSSION ANXIETY    Sciatic leg pain RIGHT

## 2024-03-15 NOTE — ED CDU PROVIDER INITIAL DAY NOTE - OBJECTIVE STATEMENT
Patient is a 63-year-old male, past medical history of COPD, GERD, sciatica, history of alcohol misuse disorder, schizophrenia, hypertension, comes in from [facility] for evaluation of aggression and paranoia.  Nursing staff states that for the past week patient has been more paranoid and wanting to go to the bank to get money.  Also verbalized but did not act out not wanting to fight with another individual.  Nursing home psychiatrist increased his dose of Seroquel from 25 to 50 mg today, but no change in forementioned behavior.  Patient is poor historian unable to obtain clear history.

## 2024-03-15 NOTE — ED BEHAVIORAL HEALTH PROGRESS NOTE - CASE SUMMARY/FORMULATION (CLEARLY DOCUMENT RATIONALE FOR DISPOSITION CHANGE)
Patient is a 64yo M, domiciled at [facility], [facility] parkinson's disease, COPD, HTN, anemia of chronic disease, GERD, dementia, BPH, essential tremor, folate deficiency, PPH schizoaffective disorder, denies suicide attempts, remote hx alcohol use disorder, BIBEMS from residence for altered mental status. Psychiatry consulted for mental health evaluation.     Upon assessment, patient is calm, cooperative to best of his abilities, AOx1. Patient does appear to have limited recollection of what happened at the residence. Patient does not endorse any suicidal ideation, homicidal ideation or AVH. Collateral from residence does report acute decline in mental status and behavior over the past week. It does not appear at this time that patient's acute decline is due to psychiatric decompensation given the nature of behavioral changes and changes to mental status. Medical workup so far with CBC, CMP, UA, CXR, EKG negative for acute findings. Patient would benefit from further medical workup for evaluation of delirium and altered mental status. It is possible this is worsening of patient's dementia, but acute nature of changes make it more likely patient is experiencing delirium. Patient does not meet criteria for inpatient psychiatric hospitalization at this time, and does not require constant observation. No psychiatric contraindications to discharge. Can continue with current psychiatric medications of seroquel 25mg BID; will defer to medical team to adjust klonopin to lower dose as this could be contributing to patient's current presentation.

## 2024-03-15 NOTE — ED BEHAVIORAL HEALTH PROGRESS NOTE - DETAILS
spoke with nurse at residence  can ball back to CL team if patient requires further psychiatric assessment

## 2024-03-15 NOTE — H&P ADULT - ATTENDING COMMENTS
The patient is seen and examined the history labs and imaging are reviewed. I agree with the resident note unless otherwise noted.  Dementia, Agitation and worsening delirium?  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

## 2024-03-15 NOTE — ED BEHAVIORAL HEALTH PROGRESS NOTE - RISK ASSESSMENT
Modifiable risk factors include current depressive symptoms, psychotic sx, medical illness  Static risk factors include advanced age, male gender,  race, chronic mental illness  Protective factors include residential stability, no suicide attempts
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
## 2024-03-15 NOTE — ED ADULT NURSE REASSESSMENT NOTE - NS ED NURSE REASSESS COMMENT FT1
Pt A+Ox3, resting comfortably, NAD at this time. Pt restrained with posey vest for safety. Pt does not disclose paranoid thoughts at this time.

## 2024-03-15 NOTE — ED CDU PROVIDER DISPOSITION NOTE - ATTENDING CONTRIBUTION TO CARE
Patient cleared by psychiatry, will admit to medicine for worsening dementia and explosive disorder.

## 2024-03-15 NOTE — H&P ADULT - ASSESSMENT
63-year-old male, past medical history of COPD, GERD, sciatica, history of alcohol misuse disorder, schizophrenia, hypertension, comes in from [facility] for evaluation of aggression and paranoia.  Nursing staff states that for the past week patient has been more paranoid and wanting to go to the bank to get money.  Also verbalized but did not act out not wanting to fight with another individual.  Nursing home psychiatrist increased his dose of Seroquel from 25 to 50 mg today, but no change in forementioned behavior.  Patient is poor historian unable to obtain clear history.    In the ED: vitals were: BP:137/47  HR: 78  Temp:afebrile , normal RR, s/p haldol for agitation        Assessment:      #agitation and paranoia  #Delirium? sec to psychiatrist illness vs metabolic  -  at baseline patient knows his room and knows that he's in a nursing home, typically very calm  - AOx1, cooperative to best of his ability and answers some questions appropriately.   - NM from NH  reports that patient has been more confused this past week, defecating on himself, sleeping during the day and staying awake at night, worse appetite, doesn't know how to get back to his room, keeps staring at other residents and then attempts to fight them, has been combative towards staff.  - NH psychiatry increased Seroquel from 25mg to 50 mg, no change in status   - s/p haldol 2mg in the ED for agitation  - CBC, UA, RVP, toxicology wnl  - CT head negative for any pathology     Plan:   - monitor for agitation, can increase the dose of seroquel  - haldol PRN for agitation, moniter EKG for qtC  - consult Case management   - consult psychiatry if does not improve in morning         #Copd  #HTN  #schizophrenia  #Gerd  # sciatica  - c/w home meds, adjust as needed     DVT: lovenox  diet: dash diet( SS?)  activity as tolerated  fall precaution   dispo: floor               63-year-old male, past medical history of COPD, GERD, sciatica, history of alcohol misuse disorder, schizophrenia, hypertension, comes in from [facility] for evaluation of aggression and paranoia.  Nursing staff states that for the past week patient has been more paranoid and wanting to go to the bank to get money.  Also verbalized but did not act out not wanting to fight with another individual.  Nursing home psychiatrist increased his dose of Seroquel from 25 to 50 mg today, but no change in forementioned behavior.  Patient is poor historian unable to obtain clear history.    In the ED: vitals were: BP:137/47  HR: 78  Temp:afebrile , normal RR, s/p haldol for agitation        Assessment:      #agitation and paranoia  #Delirium? sec to psychiatrist illness vs metabolic  -  at baseline patient knows his room and knows that he's in a nursing home, typically very calm  - AOx1, cooperative to best of his ability and answers some questions appropriately.   - NM from NH  reports that patient has been more confused this past week, defecating on himself, sleeping during the day and staying awake at night, worse appetite, doesn't know how to get back to his room, keeps staring at other residents and then attempts to fight them, has been combative towards staff.  - NH psychiatry increased Seroquel from 25mg to 50 mg, no change in status   - s/p haldol 2mg in the ED for agitation  - CBC, UA, RVP, toxicology wnl  - CT head negative for any pathology     Plan:   - monitor for agitation, can increase the dose of seroquel  - haldol PRN for agitation, moniter EKG for qtC  - consult Case management   - consult psychiatry if does not improve in morning   - Will consider neurology evaluation       #Copd  #HTN  #schizophrenia  #Gerd  # sciatica  - c/w home meds, adjust as needed     DVT: lovenox  diet: dash diet( SS?)  activity as tolerated  fall precaution   dispo: floor

## 2024-03-15 NOTE — ED ADULT NURSE REASSESSMENT NOTE - NS ED NURSE REASSESS COMMENT FT1
Pt. received from previous RN. Pt. lying on stretcher, breathing with ease on RA. A&O x3. 20g noted to the L AC; IV intact, no redness/swelling at site. Pt. OBS.

## 2024-03-15 NOTE — ED BEHAVIORAL HEALTH PROGRESS NOTE - COLLATERAL INFORMATION (NAME, PHONE, RELATIONSHIP):
[facility], 6th Floor- [phone] [facility], 6th Floor- [phone] Parker nurse manager

## 2024-03-15 NOTE — H&P ADULT - HISTORY OF PRESENT ILLNESS
63-year-old male, past medical history of COPD, GERD, sciatica, history of alcohol misuse disorder, schizophrenia, hypertension, comes in from [facility] for evaluation of aggression and paranoia.  Nursing staff states that for the past week patient has been more paranoid and wanting to go to the bank to get money.  Also verbalized but did not act out not wanting to fight with another individual.  Nursing home psychiatrist increased his dose of Seroquel from 25 to 50 mg today, but no change in forementioned behavior.  Patient is poor historian unable to obtain clear history.    In the ED: vitals were: BP:137/47  HR: 78  Temp:afebrile

## 2024-03-15 NOTE — ED CDU PROVIDER INITIAL DAY NOTE - PHYSICAL EXAMINATION
head atraumatic/normocephalic, PERRL, poor dentition with multiple missing teeth, MMM, neck is supple without meningeal signs, no midline cervical spine tenderness to palpation, speaking full sentences, equal air entry bilaterally, no wheezing or rhonchi (poor inspiratory effort on the patient's part limits exam), RRR, well-perfused extremities, abdomen soft/NT/ND, moving all extremities equally, good handgrip, he follows simple directions, but appears to be confused as to place and time.  Does not answer questions appropriately and seems internally preoccupied.

## 2024-03-15 NOTE — H&P ADULT - NSICDXPASTSURGICALHX_GEN_ALL_CORE_FT
PAST SURGICAL HISTORY:  Capsular cataract of right eye     Encounter for cholecystectomy     Gall bladder disease LAP STEPHANIE    S/P hip replacement, right

## 2024-03-15 NOTE — ED BEHAVIORAL HEALTH PROGRESS NOTE - OTHER
possible delusion or confabulation that there was a test where he had to fight someone; patient does not appear internally preoccupied on exam Not formally assessed  likely chronically low-end fair/poor due to dementia

## 2024-03-15 NOTE — H&P ADULT - NSHPPHYSICALEXAM_GEN_ALL_CORE
GENERAL: NAD, lying in bed comfortably  HEAD:  Atraumatic, normocephalic  EYES: EOMI, PERRLA, conjunctiva and sclera clear  NECK: Supple, trachea midline, no JVD  HEART: Regular rate and rhythm, no murmurs, rubs, or gallops  LUNGS: Unlabored respirations.  Clear to auscultation bilaterally, no crackles, wheezing, or rhonchi  ABDOMEN: Soft, nontender, nondistended, +BS  EXTREMITIES: 2+ peripheral pulses bilaterally. No clubbing, cyanosis, or edema  NERVOUS SYSTEM:  A&Ox1, moving all extremities  SKIN: No rashes or lesions

## 2024-03-15 NOTE — ED CDU PROVIDER INITIAL DAY NOTE - PROGRESS NOTE DETAILS
Spoke to psychiatry team, state the patient is cleared from psychiatric standpoint, that they do not believe altered mental status is due to psychiatric decompensation, that it seems that patient has acutely decompensated in the past week.  Recommend medical admission to further workup altered mental status -CD

## 2024-03-15 NOTE — ED BEHAVIORAL HEALTH PROGRESS NOTE - PSYCHIATRIC ISSUES AND PLAN (INCLUDE STANDING AND PRN MEDICATION)
can continue with seroquel 25mg BID  #Severe agitation/aggression  -For severe agitation not responding to behavioral intervention, may give seroquel 50 mg po q6h prn, ativan 2 mg po q6h prn, with escalation to IM if patient refusing PO and remains an imminent danger to self or others.   -If IM antipsychotic is administered, please perform follow-up ECG for QTc monitoring (<500).

## 2024-03-16 LAB
ALBUMIN SERPL ELPH-MCNC: 3.9 G/DL — SIGNIFICANT CHANGE UP (ref 3.5–5.2)
ALP SERPL-CCNC: 59 U/L — SIGNIFICANT CHANGE UP (ref 30–115)
ALT FLD-CCNC: 16 U/L — SIGNIFICANT CHANGE UP (ref 0–41)
ANION GAP SERPL CALC-SCNC: 11 MMOL/L — SIGNIFICANT CHANGE UP (ref 7–14)
AST SERPL-CCNC: 16 U/L — SIGNIFICANT CHANGE UP (ref 0–41)
BASOPHILS # BLD AUTO: 0.01 K/UL — SIGNIFICANT CHANGE UP (ref 0–0.2)
BASOPHILS NFR BLD AUTO: 0.2 % — SIGNIFICANT CHANGE UP (ref 0–1)
BILIRUB SERPL-MCNC: 1.6 MG/DL — HIGH (ref 0.2–1.2)
BUN SERPL-MCNC: 16 MG/DL — SIGNIFICANT CHANGE UP (ref 10–20)
CALCIUM SERPL-MCNC: 8.8 MG/DL — SIGNIFICANT CHANGE UP (ref 8.4–10.4)
CHLORIDE SERPL-SCNC: 107 MMOL/L — SIGNIFICANT CHANGE UP (ref 98–110)
CO2 SERPL-SCNC: 23 MMOL/L — SIGNIFICANT CHANGE UP (ref 17–32)
CREAT SERPL-MCNC: 0.9 MG/DL — SIGNIFICANT CHANGE UP (ref 0.7–1.5)
EGFR: 95 ML/MIN/1.73M2 — SIGNIFICANT CHANGE UP
EOSINOPHIL # BLD AUTO: 0.06 K/UL — SIGNIFICANT CHANGE UP (ref 0–0.7)
EOSINOPHIL NFR BLD AUTO: 1.1 % — SIGNIFICANT CHANGE UP (ref 0–8)
GLUCOSE SERPL-MCNC: 103 MG/DL — HIGH (ref 70–99)
HCT VFR BLD CALC: 40.1 % — LOW (ref 42–52)
HCV AB S/CO SERPL IA: 0.04 COI — SIGNIFICANT CHANGE UP
HCV AB SERPL-IMP: SIGNIFICANT CHANGE UP
HGB BLD-MCNC: 13.5 G/DL — LOW (ref 14–18)
IMM GRANULOCYTES NFR BLD AUTO: 0.2 % — SIGNIFICANT CHANGE UP (ref 0.1–0.3)
LYMPHOCYTES # BLD AUTO: 0.86 K/UL — LOW (ref 1.2–3.4)
LYMPHOCYTES # BLD AUTO: 15.6 % — LOW (ref 20.5–51.1)
MCHC RBC-ENTMCNC: 31.5 PG — HIGH (ref 27–31)
MCHC RBC-ENTMCNC: 33.7 G/DL — SIGNIFICANT CHANGE UP (ref 32–37)
MCV RBC AUTO: 93.5 FL — SIGNIFICANT CHANGE UP (ref 80–94)
MONOCYTES # BLD AUTO: 0.4 K/UL — SIGNIFICANT CHANGE UP (ref 0.1–0.6)
MONOCYTES NFR BLD AUTO: 7.3 % — SIGNIFICANT CHANGE UP (ref 1.7–9.3)
NEUTROPHILS # BLD AUTO: 4.17 K/UL — SIGNIFICANT CHANGE UP (ref 1.4–6.5)
NEUTROPHILS NFR BLD AUTO: 75.6 % — HIGH (ref 42.2–75.2)
NRBC # BLD: 0 /100 WBCS — SIGNIFICANT CHANGE UP (ref 0–0)
PHOSPHATE SERPL-MCNC: 2.9 MG/DL — SIGNIFICANT CHANGE UP (ref 2.1–4.9)
PLATELET # BLD AUTO: 112 K/UL — LOW (ref 130–400)
PMV BLD: 11.9 FL — HIGH (ref 7.4–10.4)
POTASSIUM SERPL-MCNC: 4.2 MMOL/L — SIGNIFICANT CHANGE UP (ref 3.5–5)
POTASSIUM SERPL-SCNC: 4.2 MMOL/L — SIGNIFICANT CHANGE UP (ref 3.5–5)
PROT SERPL-MCNC: 6.4 G/DL — SIGNIFICANT CHANGE UP (ref 6–8)
RBC # BLD: 4.29 M/UL — LOW (ref 4.7–6.1)
RBC # FLD: 13.7 % — SIGNIFICANT CHANGE UP (ref 11.5–14.5)
SODIUM SERPL-SCNC: 141 MMOL/L — SIGNIFICANT CHANGE UP (ref 135–146)
WBC # BLD: 5.51 K/UL — SIGNIFICANT CHANGE UP (ref 4.8–10.8)
WBC # FLD AUTO: 5.51 K/UL — SIGNIFICANT CHANGE UP (ref 4.8–10.8)

## 2024-03-16 PROCEDURE — 99232 SBSQ HOSP IP/OBS MODERATE 35: CPT

## 2024-03-16 PROCEDURE — 93010 ELECTROCARDIOGRAM REPORT: CPT

## 2024-03-16 RX ORDER — HALOPERIDOL DECANOATE 100 MG/ML
1 INJECTION INTRAMUSCULAR EVERY 8 HOURS
Refills: 0 | Status: DISCONTINUED | OUTPATIENT
Start: 2024-03-16 | End: 2024-03-16

## 2024-03-16 RX ORDER — METRONIDAZOLE 7.5 MG/G
1 GEL VAGINAL
Refills: 0 | DISCHARGE

## 2024-03-16 RX ORDER — LACTULOSE 10 G/15ML
20 SOLUTION ORAL DAILY
Refills: 0 | Status: DISCONTINUED | OUTPATIENT
Start: 2024-03-16 | End: 2024-03-18

## 2024-03-16 RX ORDER — LABETALOL HCL 100 MG
100 TABLET ORAL
Refills: 0 | Status: DISCONTINUED | OUTPATIENT
Start: 2024-03-16 | End: 2024-03-18

## 2024-03-16 RX ORDER — LANOLIN ALCOHOL/MO/W.PET/CERES
5 CREAM (GRAM) TOPICAL AT BEDTIME
Refills: 0 | Status: DISCONTINUED | OUTPATIENT
Start: 2024-03-16 | End: 2024-03-18

## 2024-03-16 RX ORDER — QUETIAPINE FUMARATE 200 MG/1
50 TABLET, FILM COATED ORAL EVERY 6 HOURS
Refills: 0 | Status: DISCONTINUED | OUTPATIENT
Start: 2024-03-16 | End: 2024-03-18

## 2024-03-16 RX ORDER — ENOXAPARIN SODIUM 100 MG/ML
40 INJECTION SUBCUTANEOUS EVERY 24 HOURS
Refills: 0 | Status: DISCONTINUED | OUTPATIENT
Start: 2024-03-16 | End: 2024-03-18

## 2024-03-16 RX ORDER — AMANTADINE HCL 100 MG
100 CAPSULE ORAL
Refills: 0 | Status: DISCONTINUED | OUTPATIENT
Start: 2024-03-16 | End: 2024-03-18

## 2024-03-16 RX ORDER — MIRTAZAPINE 45 MG/1
15 TABLET, ORALLY DISINTEGRATING ORAL AT BEDTIME
Refills: 0 | Status: DISCONTINUED | OUTPATIENT
Start: 2024-03-16 | End: 2024-03-18

## 2024-03-16 RX ORDER — FINASTERIDE 5 MG/1
5 TABLET, FILM COATED ORAL DAILY
Refills: 0 | Status: DISCONTINUED | OUTPATIENT
Start: 2024-03-16 | End: 2024-03-18

## 2024-03-16 RX ORDER — SENNA PLUS 8.6 MG/1
2 TABLET ORAL AT BEDTIME
Refills: 0 | Status: DISCONTINUED | OUTPATIENT
Start: 2024-03-16 | End: 2024-03-18

## 2024-03-16 RX ORDER — QUETIAPINE FUMARATE 200 MG/1
50 TABLET, FILM COATED ORAL
Refills: 0 | Status: DISCONTINUED | OUTPATIENT
Start: 2024-03-16 | End: 2024-03-16

## 2024-03-16 RX ORDER — DONEPEZIL HYDROCHLORIDE 10 MG/1
5 TABLET, FILM COATED ORAL AT BEDTIME
Refills: 0 | Status: DISCONTINUED | OUTPATIENT
Start: 2024-03-16 | End: 2024-03-18

## 2024-03-16 RX ORDER — ACETAMINOPHEN 500 MG
650 TABLET ORAL EVERY 6 HOURS
Refills: 0 | Status: DISCONTINUED | OUTPATIENT
Start: 2024-03-16 | End: 2024-03-18

## 2024-03-16 RX ADMIN — DONEPEZIL HYDROCHLORIDE 5 MILLIGRAM(S): 10 TABLET, FILM COATED ORAL at 21:51

## 2024-03-16 RX ADMIN — FINASTERIDE 5 MILLIGRAM(S): 5 TABLET, FILM COATED ORAL at 12:05

## 2024-03-16 RX ADMIN — MIRTAZAPINE 15 MILLIGRAM(S): 45 TABLET, ORALLY DISINTEGRATING ORAL at 21:52

## 2024-03-16 RX ADMIN — Medication 20 MILLIGRAM(S): at 05:33

## 2024-03-16 RX ADMIN — Medication 5 MILLIGRAM(S): at 21:52

## 2024-03-16 RX ADMIN — Medication 100 MILLIGRAM(S): at 17:27

## 2024-03-16 RX ADMIN — Medication 100 MILLIGRAM(S): at 05:33

## 2024-03-16 RX ADMIN — LACTULOSE 20 GRAM(S): 10 SOLUTION ORAL at 12:05

## 2024-03-16 RX ADMIN — Medication 100 MILLIGRAM(S): at 17:28

## 2024-03-16 RX ADMIN — ENOXAPARIN SODIUM 40 MILLIGRAM(S): 100 INJECTION SUBCUTANEOUS at 05:34

## 2024-03-16 NOTE — PROGRESS NOTE ADULT - SUBJECTIVE AND OBJECTIVE BOX
24H events:    Patient is a 63y old Male who presents with a chief complaint of agitation (15 Mar 2024 22:41)    Primary diagnosis of Paranoia    Today is 1d of hospitalization. This morning patient was seen and examined at bedside, resting comfortably in bed.    No acute or major events overnight.    Code Status: Full    Family communication:  Contact date:  Name of person contacted:  Relationship to patient:  Communication details:  What matters most:    PAST MEDICAL & SURGICAL HISTORY  Alcohol abuse  LAST DRINK APPROX ONE YEAR AGO    HTN (hypertension)    Schizo affective schizophrenia  DEPRESSSION ANXIETY    Constipation    Sciatic leg pain  RIGHT    Acid reflux disease    History of COPD    Gall bladder disease  LAP STEPHANIE    S/P hip replacement, right    Capsular cataract of right eye    Encounter for cholecystectomy      ALLERGIES:  No Known Allergies    MEDICATIONS:  STANDING MEDICATIONS  amantadine 100 milliGRAM(s) Oral two times a day  donepezil 5 milliGRAM(s) Oral at bedtime  enalapril 20 milliGRAM(s) Oral daily  enoxaparin Injectable 40 milliGRAM(s) SubCutaneous every 24 hours  finasteride 5 milliGRAM(s) Oral daily  labetalol 100 milliGRAM(s) Oral two times a day  lactulose Syrup 20 Gram(s) Oral daily  melatonin 5 milliGRAM(s) Oral at bedtime  mirtazapine 15 milliGRAM(s) Oral at bedtime  rifAXIMin 550 milliGRAM(s) Oral two times a day    PRN MEDICATIONS  acetaminophen     Tablet .. 650 milliGRAM(s) Oral every 6 hours PRN  LORazepam     Tablet 2 milliGRAM(s) Oral every 6 hours PRN  QUEtiapine 50 milliGRAM(s) Oral every 6 hours PRN  senna 2 Tablet(s) Oral at bedtime PRN    VITALS:   T(F): 98  HR: 74  BP: 155/99  RR: 18  SpO2: 99%    PHYSICAL EXAM:  GENERAL:   ( x) NAD, lying in bed comfortably     (  ) obtunded     (  ) lethargic     (  ) somnolent    HEAD:   ( x) Atraumatic     (  ) hematoma     (  ) laceration (specify location:       )     NECK:  (x) Supple     (  ) neck stiffness     (  ) nuchal rigidity     (  )  no JVD     (  ) JVD present ( -- cm)    HEART:  Rate -->     (x) normal rate     (  ) bradycardic     (  ) tachycardic  Rhythm -->     (x) regular     (  ) regularly irregular     (  ) irregularly irregular  Murmurs -->     (x) normal s1s2     (  ) systolic murmur     (  ) diastolic murmur     (  ) continuous murmur      (  ) S3 present     (  ) S4 present    LUNGS:   ( x)Unlabored respirations     (  ) tachypnea  ( x) B/L air entry     (  ) decreased breath sounds in:  (location     )    ( x) no adventitious sound     (  ) crackles     (  ) wheezing      (  ) rhonchi      (specify location:       )  (  ) chest wall tenderness (specify location:       )    ABDOMEN:   ( x) Soft     (  ) tense   |   (  ) nondistended     (  ) distended   |   (  ) +BS     (  ) hypoactive bowel sounds     (  ) hyperactive bowel sounds  ( x) nontender     (  ) RUQ tenderness     (  ) RLQ tenderness     (  ) LLQ tenderness     (  ) epigastric tenderness     (  ) diffuse tenderness  (  ) Splenomegaly      (  ) Hepatomegaly      (  ) Jaundice     (  ) ecchymosis     EXTREMITIES:  ( x) Normal     (  ) Rash     (  ) ecchymosis     (  ) varicose veins      (  ) pitting edema     (  ) non-pitting edema   (  ) ulceration     (  ) gangrene:     (location:     )    NERVOUS SYSTEM:    ( x) A&Ox1    (  ) confused     (  ) lethargic  CN II-XII:     ( x) Intact     (  ) deficits found     (Specify:     )   Upper extremities:     (  ) no sensorimotor deficits     (  ) weakness     (  ) loss of proprioception/vibration     (  ) loss of touch/temperature (specify:    )    LABS:                        13.5   5.51  )-----------( 112      ( 16 Mar 2024 05:56 )             40.1     03-16    141  |  107  |  16  ----------------------------<  103<H>  4.2   |  23  |  0.9    Ca    8.8      16 Mar 2024 05:56  Phos  2.9     03-16    TPro  6.4  /  Alb  3.9  /  TBili  1.6<H>  /  DBili  x   /  AST  16  /  ALT  16  /  AlkPhos  59  03-16      Urinalysis Basic - ( 16 Mar 2024 05:56 )    Color: x / Appearance: x / SG: x / pH: x  Gluc: 103 mg/dL / Ketone: x  / Bili: x / Urobili: x   Blood: x / Protein: x / Nitrite: x   Leuk Esterase: x / RBC: x / WBC x   Sq Epi: x / Non Sq Epi: x / Bacteria: x

## 2024-03-16 NOTE — CHART NOTE - NSCHARTNOTEFT_GEN_A_CORE
Patient seen by nocturnist in AM. Management per H&P. Patient seen and examined at bedside. Vitals and exam stable. Chart reviewed. Discussed plan with resident.

## 2024-03-16 NOTE — PROGRESS NOTE ADULT - ASSESSMENT
63-year-old male, past medical history of COPD, GERD, sciatica, history of alcohol misuse disorder, schizophrenia, hypertension, comes in from [facility] for evaluation of aggression and paranoia.  Nursing staff states that for the past week patient has been more paranoid and wanting to go to the bank to get money.  Also verbalized but did not act out not wanting to fight with another individual.  Nursing home psychiatrist increased his dose of Seroquel from 25 to 50 mg today, but no change in forementioned behavior.  Patient is poor historian unable to obtain clear history.    Assessment:    #agitation and paranoia  #Delirium? sec to psychiatrist illness vs metabolic  - s/p haldol 2mg in the ED for agitation  - at baseline patient knows his room and knows that he's in a nursing home, typically very calm  - AOx1, cooperative to best of his ability and answers some questions appropriately.   - NM from NH  reports that patient has been more confused this past week, defecating on himself, sleeping during the day and staying awake at night, worse appetite, doesn't know how to get back to his room, keeps staring at other residents and then attempts to fight them, has been combative towards staff.  - NH psychiatry recs:  -For severe agitation not responding to behavioral intervention, may give seroquel 50 mg po q6h prn, ativan 2 mg po q6h prn, with escalation to IM if patient refusing PO and remains an imminent danger to self or others.   -If IM antipsychotic is administered, please perform follow-up ECG for QTc monitoring (<500).    - CBC, UA, RVP, toxicology wnl  - CT head negative for any pathology     #Copd  #HTN  #schizophrenia  #Gerd  # sciatica  - c/w home meds, adjust as needed     DVT: lovenox  diet: dash diet( SS?)  activity as tolerated  fall precaution   dispo: floor   63-year-old male, past medical history of COPD, GERD, sciatica, history of alcohol misuse disorder, schizophrenia, hypertension, comes in from [facility] for evaluation of aggression and paranoia.  Nursing staff states that for the past week patient has been more paranoid and wanting to go to the bank to get money.  Also verbalized but did not act out not wanting to fight with another individual.  Nursing home psychiatrist increased his dose of Seroquel from 25 to 50 mg today, but no change in forementioned behavior.  Patient is poor historian unable to obtain clear history.    #Agitation and Paranoia  #Delirium? sec to psychiatrist illness vs metabolic  #HO schizophrenia  - s/p haldol 2mg in the ED for agitation  - at baseline patient knows his room and knows that he's in a nursing home, typically very calm  - AOx1 to person only today, cooperative to best of his ability and answers some questions appropriately.   - NM from NH  reports that patient has been more confused this past week, defecating on himself, sleeping during the day and staying awake at night, worse appetite, doesn't know how to get back to his room, keeps staring at other residents and then attempts to fight them, has been combative towards staff.  - NH psychiatry recs:  -For severe agitation not responding to behavioral intervention, may give seroquel 50 mg po q6h prn, ativan 2 mg po q6h prn, with escalation to IM if patient refusing PO and remains an imminent danger to self or others.   -If IM antipsychotic is administered, please perform follow-up ECG for QTc monitoring (<500).  - CBC, UA, RVP, toxicology wnl  - CT head negative for any pathology   - Continue with Seroquel, mirtazapine, donepezil, and amantadine  - DC Ativan PRN. Can use haloperidol PRN instead    #HO COPD  #HO HTN  #HO GERD  #HO sciatica  - c/w home meds, adjust as needed     DVT Prophylaxis: Lovenox  GI Prophylaxis: not indicated   Diet: DASH/TLC  Activity: OOBTC  Code Status: Full Code  Dispo: acute

## 2024-03-16 NOTE — PROGRESS NOTE ADULT - ATTENDING COMMENTS
63-year-old male, past medical history of COPD, GERD, sciatica, history of alcohol misuse disorder, schizophrenia, hypertension, comes in from [facility] for evaluation of aggression and paranoia.  Nursing staff states that for the past week patient has been more paranoid and wanting to go to the bank to get money.  Also verbalized but did not act out not wanting to fight with another individual.  Nursing home psychiatrist increased his dose of Seroquel from 25 to 50 mg today, but no change in forementioned behavior.  Patient is poor historian unable to obtain clear history.    #Agitation and Paranoia  #Delirium? sec to psychiatrist illness vs metabolic  #HO schizophrenia  - s/p haldol 2mg in the ED for agitation  - at baseline patient knows his room and knows that he's in a nursing home, typically very calm  - AOx1 to person only today, cooperative to best of his ability and answers some questions appropriately.   - NM from NH  reports that patient has been more confused this past week, defecating on himself, sleeping during the day and staying awake at night, worse appetite, doesn't know how to get back to his room, keeps staring at other residents and then attempts to fight them, has been combative towards staff.  - NH psychiatry recs:  -For severe agitation not responding to behavioral intervention, may give seroquel 50 mg po q6h prn, ativan 2 mg po q6h prn, with escalation to IM if patient refusing PO and remains an imminent danger to self or others.   -If IM antipsychotic is administered, please perform follow-up ECG for QTc monitoring (<500).  - CBC, UA, RVP, toxicology wnl  - CT head negative for any pathology   - Continue with Seroquel, mirtazapine, donepezil, and amantadine  - DC Ativan PRN. Can use haloperidol PRN instead    #HO COPD  #HO HTN  #HO GERD  #HO sciatica  - c/w home meds, adjust as needed     DVT Prophylaxis: Lovenox  GI Prophylaxis: not indicated   Diet: DASH/TLC  Activity: OOBTC  Code Status: Full Code  Dispo: acute    Total time spent to complete patient's bedside assessment, review medical chart, discuss medical plan of care with covering medical team was more than 35 minutes with >50% of time spent face to face with patient, discussion with patient/family and/or coordination of care. My note supersedes the medical resident note in case of discrepancy.

## 2024-03-17 LAB — SARS-COV-2 RNA SPEC QL NAA+PROBE: SIGNIFICANT CHANGE UP

## 2024-03-17 PROCEDURE — 99232 SBSQ HOSP IP/OBS MODERATE 35: CPT

## 2024-03-17 RX ADMIN — MIRTAZAPINE 15 MILLIGRAM(S): 45 TABLET, ORALLY DISINTEGRATING ORAL at 21:48

## 2024-03-17 RX ADMIN — Medication 5 MILLIGRAM(S): at 21:47

## 2024-03-17 RX ADMIN — LACTULOSE 20 GRAM(S): 10 SOLUTION ORAL at 12:06

## 2024-03-17 RX ADMIN — FINASTERIDE 5 MILLIGRAM(S): 5 TABLET, FILM COATED ORAL at 12:07

## 2024-03-17 RX ADMIN — Medication 100 MILLIGRAM(S): at 17:44

## 2024-03-17 RX ADMIN — DONEPEZIL HYDROCHLORIDE 5 MILLIGRAM(S): 10 TABLET, FILM COATED ORAL at 21:47

## 2024-03-17 NOTE — PROGRESS NOTE ADULT - SUBJECTIVE AND OBJECTIVE BOX
DAMARIS TASHI 63y Male  MRN#: [id number]   Hospital Day: 2d    SUBJECTIVE  Patient is a 63y old Male who presents with a chief complaint of agitation (16 Mar 2024 09:44)  Currently admitted to medicine with the primary diagnosis of Paranoia      INTERVAL HPI AND OVERNIGHT EVENTS:  Patient was examined and seen at bedside. This morning he is resting comfortably in bed. No overnight events reported by nursing staff.    REVIEW OF SYMPTOMS:  All other review of systems negative, except as noted in interval HPI     OBJECTIVE  PAST MEDICAL & SURGICAL HISTORY  Alcohol abuse  LAST DRINK APPROX ONE YEAR AGO    HTN (hypertension)    Schizo affective schizophrenia  DEPRESSSION ANXIETY    Constipation    Sciatic leg pain  RIGHT    Acid reflux disease    History of COPD    Gall bladder disease  LAP STEPHANIE    S/P hip replacement, right    Capsular cataract of right eye    Encounter for cholecystectomy      ALLERGIES:  No Known Allergies    MEDICATIONS:  STANDING MEDICATIONS  amantadine 100 milliGRAM(s) Oral two times a day  donepezil 5 milliGRAM(s) Oral at bedtime  enalapril 20 milliGRAM(s) Oral daily  enoxaparin Injectable 40 milliGRAM(s) SubCutaneous every 24 hours  finasteride 5 milliGRAM(s) Oral daily  labetalol 100 milliGRAM(s) Oral two times a day  lactulose Syrup 20 Gram(s) Oral daily  melatonin 5 milliGRAM(s) Oral at bedtime  mirtazapine 15 milliGRAM(s) Oral at bedtime  rifAXIMin 550 milliGRAM(s) Oral two times a day    PRN MEDICATIONS  acetaminophen     Tablet .. 650 milliGRAM(s) Oral every 6 hours PRN  QUEtiapine 50 milliGRAM(s) Oral every 6 hours PRN  senna 2 Tablet(s) Oral at bedtime PRN      VITAL SIGNS: Last 24 Hours  T(C): 36.1 (17 Mar 2024 05:29), Max: 36.9 (16 Mar 2024 13:40)  T(F): 96.9 (17 Mar 2024 05:29), Max: 98.4 (16 Mar 2024 13:40)  HR: 77 (17 Mar 2024 05:29) (64 - 77)  BP: 160/80 (17 Mar 2024 05:29) (112/66 - 160/80)  BP(mean): 112 (17 Mar 2024 05:29) (112 - 112)  RR: 18 (16 Mar 2024 13:40) (18 - 18)  SpO2: --    LABS:                        13.5   5.51  )-----------( 112      ( 16 Mar 2024 05:56 )             40.1     03-16    141  |  107  |  16  ----------------------------<  103<H>  4.2   |  23  |  0.9    Ca    8.8      16 Mar 2024 05:56  Phos  2.9     03-16    TPro  6.4  /  Alb  3.9  /  TBili  1.6<H>  /  DBili  x   /  AST  16  /  ALT  16  /  AlkPhos  59  03-16      Urinalysis Basic - ( 16 Mar 2024 05:56 )    Color: x / Appearance: x / SG: x / pH: x  Gluc: 103 mg/dL / Ketone: x  / Bili: x / Urobili: x   Blood: x / Protein: x / Nitrite: x   Leuk Esterase: x / RBC: x / WBC x   Sq Epi: x / Non Sq Epi: x / Bacteria: x                RADIOLOGY:  < from: CT Head No Cont (03.15.24 @ 15:20) >  FINDINGS:    The ventricles and sulci areunremarkable in appearance.     There is no intraparenchymal hematoma, mass effect or midline shift. No   abnormal extra-axial fluid collections are present.    The calvarium is intact. The visualized intraorbital compartments,   paranasal sinuses and mastoid complexes appear free of acute disease.   There has been bilateral cataract surgery.    IMPRESSION:  No acute intracranial pathology. No evidence of midline shift, mass   effect or intracranial hemorrhage.    < end of copied text >      PHYSICAL EXAM:  CONSTITUTIONAL: No acute distress, well-developed, well-groomed, AAOx1 to person only  HEAD: Atraumatic, normocephalic  EYES: EOM intact, PERRLA, conjunctiva and sclera clear  ENT: Supple, no masses, no thyromegaly, no bruits, no JVD; moist mucous membranes  PULMONARY: Clear to auscultation bilaterally; no wheezes, rales, or rhonchi  CARDIOVASCULAR: Regular rate and rhythm; no murmurs, rubs, or gallops  GASTROINTESTINAL: Soft, non-tender, non-distended; bowel sounds present  MUSCULOSKELETAL: 2+ peripheral pulses; no clubbing, no cyanosis, no edema  NEUROLOGY: non-focal  SKIN: No rashes or lesions; warm and dry DAMARIS TASHI 63y Male  MRN#: [id number]   Hospital Day: 2d    SUBJECTIVE  Patient is a 63y old Male who presents with a chief complaint of agitation (16 Mar 2024 09:44)  Currently admitted to medicine with the primary diagnosis of Paranoia      INTERVAL HPI AND OVERNIGHT EVENTS:  Patient was examined and seen at bedside. This morning he is resting comfortably in bed. No overnight events reported by nursing staff.    REVIEW OF SYMPTOMS:  All other review of systems negative, except as noted in interval HPI     OBJECTIVE  PAST MEDICAL & SURGICAL HISTORY  Alcohol abuse  LAST DRINK APPROX ONE YEAR AGO    HTN (hypertension)    Schizo affective schizophrenia  DEPRESSSION ANXIETY    Constipation    Sciatic leg pain  RIGHT    Acid reflux disease    History of COPD    Gall bladder disease  LAP STEPHANIE    S/P hip replacement, right    Capsular cataract of right eye    Encounter for cholecystectomy      ALLERGIES:  No Known Allergies    MEDICATIONS:  STANDING MEDICATIONS  amantadine 100 milliGRAM(s) Oral two times a day  donepezil 5 milliGRAM(s) Oral at bedtime  enalapril 20 milliGRAM(s) Oral daily  enoxaparin Injectable 40 milliGRAM(s) SubCutaneous every 24 hours  finasteride 5 milliGRAM(s) Oral daily  labetalol 100 milliGRAM(s) Oral two times a day  lactulose Syrup 20 Gram(s) Oral daily  melatonin 5 milliGRAM(s) Oral at bedtime  mirtazapine 15 milliGRAM(s) Oral at bedtime  rifAXIMin 550 milliGRAM(s) Oral two times a day    PRN MEDICATIONS  acetaminophen     Tablet .. 650 milliGRAM(s) Oral every 6 hours PRN  QUEtiapine 50 milliGRAM(s) Oral every 6 hours PRN  senna 2 Tablet(s) Oral at bedtime PRN      VITAL SIGNS: Last 24 Hours  T(C): 36.1 (17 Mar 2024 05:29), Max: 36.9 (16 Mar 2024 13:40)  T(F): 96.9 (17 Mar 2024 05:29), Max: 98.4 (16 Mar 2024 13:40)  HR: 77 (17 Mar 2024 05:29) (64 - 77)  BP: 160/80 (17 Mar 2024 05:29) (112/66 - 160/80)  BP(mean): 112 (17 Mar 2024 05:29) (112 - 112)  RR: 18 (16 Mar 2024 13:40) (18 - 18)  SpO2: --    LABS:                        13.5   5.51  )-----------( 112      ( 16 Mar 2024 05:56 )             40.1     03-16    141  |  107  |  16  ----------------------------<  103<H>  4.2   |  23  |  0.9    Ca    8.8      16 Mar 2024 05:56  Phos  2.9     03-16    TPro  6.4  /  Alb  3.9  /  TBili  1.6<H>  /  DBili  x   /  AST  16  /  ALT  16  /  AlkPhos  59  03-16      Urinalysis Basic - ( 16 Mar 2024 05:56 )    Color: x / Appearance: x / SG: x / pH: x  Gluc: 103 mg/dL / Ketone: x  / Bili: x / Urobili: x   Blood: x / Protein: x / Nitrite: x   Leuk Esterase: x / RBC: x / WBC x   Sq Epi: x / Non Sq Epi: x / Bacteria: x                RADIOLOGY:  < from: CT Head No Cont (03.15.24 @ 15:20) >  FINDINGS:    The ventricles and sulci areunremarkable in appearance.     There is no intraparenchymal hematoma, mass effect or midline shift. No   abnormal extra-axial fluid collections are present.    The calvarium is intact. The visualized intraorbital compartments,   paranasal sinuses and mastoid complexes appear free of acute disease.   There has been bilateral cataract surgery.    IMPRESSION:  No acute intracranial pathology. No evidence of midline shift, mass   effect or intracranial hemorrhage.    < end of copied text >      PHYSICAL EXAM:  CONSTITUTIONAL: No acute distress, well-developed, well-groomed, AAOx1 to person only (gets year correct but thinks it's September)  HEAD: Atraumatic, normocephalic  EYES: EOM intact, PERRLA, conjunctiva and sclera clear  ENT: Supple, no masses, no thyromegaly, no bruits, no JVD; moist mucous membranes  PULMONARY: Clear to auscultation bilaterally; no wheezes, rales, or rhonchi  CARDIOVASCULAR: Regular rate and rhythm; no murmurs, rubs, or gallops  GASTROINTESTINAL: Soft, non-tender, non-distended; bowel sounds present  MUSCULOSKELETAL: 2+ peripheral pulses; no clubbing, no cyanosis, no edema  NEUROLOGY: non-focal  SKIN: No rashes or lesions; warm and dry

## 2024-03-17 NOTE — PROGRESS NOTE ADULT - ASSESSMENT
63-year-old male, past medical history of COPD, GERD, sciatica, history of alcohol misuse disorder, schizophrenia, hypertension, comes in from [facility] for evaluation of aggression and paranoia.  Nursing staff states that for the past week patient has been more paranoid and wanting to go to the bank to get money.  Also verbalized but did not act out not wanting to fight with another individual.  Nursing home psychiatrist increased his dose of Seroquel from 25 to 50 mg today, but no change in forementioned behavior.  Patient is poor historian unable to obtain clear history.    #Agitation and Paranoia  #Delirium? secondary to psychiatrist illness vs metabolic  #HO schizophrenia  - s/p haldol 2mg in the ED for agitation  - at baseline patient knows his room and knows that he's in a nursing home, typically very calm  - AOx1 to person only today, cooperative to best of his ability and answers some questions appropriately.   - NM from NH  reports that patient has been more confused this past week, defecating on himself, sleeping during the day and staying awake at night, worse appetite, doesn't know how to get back to his room, keeps staring at other residents and then attempts to fight them, has been combative towards staff.  - NH psychiatry recs:  -For severe agitation not responding to behavioral intervention, may give seroquel 50 mg po q6h prn, ativan 2 mg po q6h prn, with escalation to IM if patient refusing PO and remains an imminent danger to self or others.   -If IM antipsychotic is administered, please perform follow-up ECG for QTc monitoring (<500).  - CBC, UA, RVP, toxicology wnl  - CT head negative for any pathology   - Continue with Seroquel, mirtazapine, donepezil, and amantadine  - Can use haloperidol PRN for agitation    #HO COPD  #HO HTN  #HO GERD  #HO sciatica  - c/w home meds, adjust as needed     DVT Prophylaxis: Lovenox  GI Prophylaxis: not indicated   Diet: DASH/TLC  Activity: OOBTC  Code Status: Full Code  Dispo: acute 63-year-old male, past medical history of COPD, GERD, sciatica, history of alcohol misuse disorder, schizophrenia, hypertension, comes in from [facility] for evaluation of aggression and paranoia.  Nursing staff states that for the past week patient has been more paranoid and wanting to go to the bank to get money.  Also verbalized but did not act out not wanting to fight with another individual.  Nursing home psychiatrist increased his dose of Seroquel from 25 to 50 mg today, but no change in forementioned behavior.  Patient is poor historian unable to obtain clear history.    #Agitation and Paranoia  #Delirium? secondary to psychiatrist illness vs metabolic  #HO schizophrenia  - s/p haldol 2mg in the ED for agitation  - at baseline patient knows his room and knows that he's in a nursing home, typically very calm  - AOx1 to person only today, cooperative to best of his ability and answers some questions appropriately.   - NM from NH  reports that patient has been more confused this past week, defecating on himself, sleeping during the day and staying awake at night, worse appetite, doesn't know how to get back to his room, keeps staring at other residents and then attempts to fight them, has been combative towards staff.  - NH psychiatry recs:  - For severe agitation not responding to behavioral intervention, may give seroquel 50 mg po q6h prn, ativan 2 mg po q6h prn, with escalation to IM if patient refusing PO and remains an imminent danger to self or others.   - If IM antipsychotic is administered, please perform follow-up ECG for QTc monitoring (<500).  - CBC, UA, RVP, toxicology wnl  - CT head negative for any pathology   - Continue with Seroquel, mirtazapine, donepezil, and amantadine  - Can use haloperidol PRN for agitation  - Has not required any PRN medication in the past 48 hours. Possible discharge back to facility tomorrow    #HO COPD  #HO HTN  #HO GERD  #HO sciatica  - c/w home meds, adjust as needed     DVT Prophylaxis: Lovenox  GI Prophylaxis: not indicated   Diet: DASH/TLC  Activity: OOBTC  Code Status: Full Code  Dispo: anticipate for discharge tomorrow

## 2024-03-18 ENCOUNTER — TRANSCRIPTION ENCOUNTER (OUTPATIENT)
Age: 64
End: 2024-03-18

## 2024-03-18 VITALS — SYSTOLIC BLOOD PRESSURE: 132 MMHG | HEART RATE: 67 BPM | DIASTOLIC BLOOD PRESSURE: 76 MMHG

## 2024-03-18 PROCEDURE — 99239 HOSP IP/OBS DSCHRG MGMT >30: CPT

## 2024-03-18 RX ORDER — QUETIAPINE FUMARATE 200 MG/1
1 TABLET, FILM COATED ORAL
Refills: 0 | DISCHARGE

## 2024-03-18 RX ORDER — CLONAZEPAM 1 MG
1 TABLET ORAL
Refills: 0 | DISCHARGE

## 2024-03-18 RX ORDER — QUETIAPINE FUMARATE 200 MG/1
1 TABLET, FILM COATED ORAL
Qty: 0 | Refills: 0 | DISCHARGE
Start: 2024-03-18

## 2024-03-18 RX ORDER — CLONAZEPAM 1 MG
1 TABLET ORAL
Qty: 0 | Refills: 0 | DISCHARGE
Start: 2024-03-18

## 2024-03-18 RX ORDER — CLONAZEPAM 1 MG
1 TABLET ORAL
Qty: 60 | Refills: 0
Start: 2024-03-18 | End: 2024-04-16

## 2024-03-18 RX ORDER — ESCITALOPRAM OXALATE 10 MG/1
1 TABLET, FILM COATED ORAL
Qty: 0 | Refills: 0 | DISCHARGE

## 2024-03-18 RX ORDER — ESCITALOPRAM OXALATE 10 MG/1
1 TABLET, FILM COATED ORAL
Qty: 0 | Refills: 0 | DISCHARGE
Start: 2024-03-18

## 2024-03-18 RX ADMIN — ENOXAPARIN SODIUM 40 MILLIGRAM(S): 100 INJECTION SUBCUTANEOUS at 05:55

## 2024-03-18 RX ADMIN — Medication 100 MILLIGRAM(S): at 05:56

## 2024-03-18 RX ADMIN — Medication 20 MILLIGRAM(S): at 05:56

## 2024-03-18 RX ADMIN — Medication 100 MILLIGRAM(S): at 17:07

## 2024-03-18 RX ADMIN — FINASTERIDE 5 MILLIGRAM(S): 5 TABLET, FILM COATED ORAL at 11:39

## 2024-03-18 RX ADMIN — LACTULOSE 20 GRAM(S): 10 SOLUTION ORAL at 11:39

## 2024-03-18 RX ADMIN — Medication 100 MILLIGRAM(S): at 17:08

## 2024-03-18 NOTE — DISCHARGE NOTE NURSING/CASE MANAGEMENT/SOCIAL WORK - NSDCPEFALRISK_GEN_ALL_CORE
For information on Fall & Injury Prevention, visit: [email or web address] OR  [email or web address] OR  https://www.cdc.gov/steadi/patient.html

## 2024-03-18 NOTE — DISCHARGE NOTE PROVIDER - CARE PROVIDER_API CALL
MARY CONROY  [address] [place] [address]  Phone: [phone]  Fax: [phone]  Follow Up Time: 2 weeks   MARY CONROY  [address] [place] [address]  Phone: [phone]  Fax: [phone]  Follow Up Time: 1-3 days    Psychiatrist,   Phone: (   )    -  Fax: (   )    -  Follow Up Time: 1 week    Neurologist,   Phone: (   )    -  Fax: (   )    -  Follow Up Time: 1 week

## 2024-03-18 NOTE — DISCHARGE NOTE PROVIDER - NSDCMRMEDTOKEN_GEN_ALL_CORE_FT
acetaminophen 325 mg oral capsule: 2 cap(s) orally every 6 hours, As Needed  amantadine 100 mg oral capsule: 1 cap(s) orally 2 times a day  docusate sodium 100 mg oral capsule: 1 cap(s) orally 3 times a day  donepezil 5 mg oral tablet: 1 tab(s) orally once a day (at bedtime)  enalapril 20 mg oral tablet: 1 tab(s) orally once a day  finasteride 5 mg oral tablet: 1 tab(s) orally once a day (at bedtime)  labetalol 100 mg oral tablet: 1 tab(s) orally 2 times a day  lactulose 10 g/15 mL oral syrup: 30 milliliter(s) orally once a day  Melatonin 5 mg oral tablet: 1 tab(s) orally once a day  Metamucil 3.4 g/11 g oral powder for reconstitution: 3.4 gram(s) orally once a day (at bedtime) mix with 8oz  water  metroNIDAZOLE 0.75% topical cream: Apply topically to affected area 2 times a day  mirtazapine 15 mg oral tablet: 1 tab(s) orally once a day (at bedtime)  pantoprazole 40 mg oral delayed release tablet: 1 tab(s) orally once a day  QUEtiapine 50 mg oral tablet: 1 tab(s) orally every 6 hours As needed agitation  senna oral tablet: 2 tab(s) orally once a day (at bedtime), As needed, Constipation  Xifaxan 550 mg oral tablet: 1 tab(s) orally 2 times a day   acetaminophen 325 mg oral capsule: 2 cap(s) orally every 6 hours, As Needed  amantadine 100 mg oral capsule: 1 cap(s) orally 2 times a day  clonazePAM 0.5 mg oral tablet: 1 tab(s) orally 2 times a day MDD: 2  docusate sodium 100 mg oral capsule: 1 cap(s) orally 3 times a day  donepezil 5 mg oral tablet: 1 tab(s) orally once a day (at bedtime)  enalapril 20 mg oral tablet: 1 tab(s) orally once a day  escitalopram 20 mg oral tablet: 1 tab(s) orally once a day  finasteride 5 mg oral tablet: 1 tab(s) orally once a day (at bedtime)  labetalol 100 mg oral tablet: 1 tab(s) orally 2 times a day  lactulose 10 g/15 mL oral syrup: 30 milliliter(s) orally once a day  Melatonin 5 mg oral tablet: 1 tab(s) orally once a day  Metamucil 3.4 g/11 g oral powder for reconstitution: 3.4 gram(s) orally once a day (at bedtime) mix with 8oz  water  metroNIDAZOLE 0.75% topical cream: Apply topically to affected area 2 times a day  mirtazapine 15 mg oral tablet: 1 tab(s) orally once a day (at bedtime)  pantoprazole 40 mg oral delayed release tablet: 1 tab(s) orally once a day  QUEtiapine 50 mg oral tablet: 1 tab(s) orally every 6 hours As needed agitation  senna oral tablet: 2 tab(s) orally once a day (at bedtime), As needed, Constipation  Xifaxan 550 mg oral tablet: 1 tab(s) orally 2 times a day   acetaminophen 325 mg oral capsule: 2 cap(s) orally every 6 hours, As Needed  amantadine 100 mg oral capsule: 1 cap(s) orally 2 times a day  clonazePAM 0.5 mg oral tablet: 1 tablet with sensor orally every 12 hours as needed for  anxiety 1 tab BID for 2 days, then 1 tab daily for 2 days, then stop  docusate sodium 100 mg oral capsule: 1 cap(s) orally 3 times a day  donepezil 5 mg oral tablet: 1 tab(s) orally once a day (at bedtime)  enalapril 20 mg oral tablet: 1 tab(s) orally once a day  escitalopram 20 mg oral tablet: 1 tab(s) orally once a day  finasteride 5 mg oral tablet: 1 tab(s) orally once a day (at bedtime)  labetalol 100 mg oral tablet: 1 tab(s) orally 2 times a day  lactulose 10 g/15 mL oral syrup: 30 milliliter(s) orally once a day  Melatonin 5 mg oral tablet: 1 tab(s) orally once a day  Metamucil 3.4 g/11 g oral powder for reconstitution: 3.4 gram(s) orally once a day (at bedtime) mix with 8oz  water  metroNIDAZOLE 0.75% topical cream: Apply topically to affected area 2 times a day  mirtazapine 15 mg oral tablet: 1 tab(s) orally once a day (at bedtime)  pantoprazole 40 mg oral delayed release tablet: 1 tab(s) orally once a day  QUEtiapine 50 mg oral tablet: 1 tab(s) orally every 6 hours As needed agitation  senna oral tablet: 2 tab(s) orally once a day (at bedtime), As needed, Constipation  Xifaxan 550 mg oral tablet: 1 tab(s) orally 2 times a day

## 2024-03-18 NOTE — DISCHARGE NOTE PROVIDER - PROVIDER TOKENS
PROVIDER:[TOKEN:[[id number]],FOLLOWUP:[2 weeks]] PROVIDER:[TOKEN:[[id number]],FOLLOWUP:[1-3 days]],FREE:[LAST:[Psychiatrist],PHONE:[(   )    -],FAX:[(   )    -],FOLLOWUP:[1 week]],FREE:[LAST:[Neurologist],PHONE:[(   )    -],FAX:[(   )    -],FOLLOWUP:[1 week]]

## 2024-03-18 NOTE — DISCHARGE NOTE NURSING/CASE MANAGEMENT/SOCIAL WORK - PATIENT PORTAL LINK FT
You can access the FollowMyHealth Patient Portal offered by [facility] by registering at the following website: [email or web address]. By joining [facility]’s FollowMyHealth portal, you will also be able to view your health information using other applications (apps) compatible with our system.

## 2024-03-18 NOTE — DISCHARGE NOTE PROVIDER - NSDCCPCAREPLAN_GEN_ALL_CORE_FT
PRINCIPAL DISCHARGE DIAGNOSIS  Diagnosis: Paranoia  Assessment and Plan of Treatment: Patient admitted with agitation. All workup was done and did not show any infectious or metabolic cause. Patient was started on PRN meds, he improved, currently stable on his medications.     PRINCIPAL DISCHARGE DIAGNOSIS  Diagnosis: Paranoia  Assessment and Plan of Treatment: Patient admitted with agitation. All workup was done and did not show any infectious or metabolic cause. Patient was started on PRN meds, he improved, currently stable on his medications.  The clonezepam was taperred down from 1mg BID to 0.5 mg BID.   Please follow up with neurology and psychiatry as outpatient     PRINCIPAL DISCHARGE DIAGNOSIS  Diagnosis: Paranoia  Assessment and Plan of Treatment: Patient admitted with agitation at nursing home. The workup did not show any infectious or metabolic cause. Seen and cleared by psychiatry. Recommend checking B12, FA, B1, TSH, ammonia, RPR, HIV and neurology appointment as outpt. Recommend tapering off clonopin to avoid withdrawal seizures. No issues with agitation while inpt.   Please follow up with neurology and psychiatry as outpatient

## 2024-03-18 NOTE — DISCHARGE NOTE PROVIDER - HOSPITAL COURSE
63-year-old male, past medical history of COPD, GERD, sciatica, history of alcohol misuse disorder, schizophrenia, hypertension, comes in from [facility] for evaluation of aggression and paranoia.  Nursing staff states that for the past week patient has been more paranoid and wanting to go to the bank to get money.  Also verbalized but did not act out not wanting to fight with another individual.  Nursing home psychiatrist increased his dose of Seroquel from 25 to 50 mg today, but no change in forementioned behavior.  Patient is poor historian unable to obtain clear history.    #Agitation and Paranoia  #Delirium? secondary to psychiatrist illness vs metabolic  #HO schizophrenia  - s/p haldol 2mg in the ED for agitation  - at baseline patient knows his room and knows that he's in a nursing home, typically very calm  - AOx1 to person only today, cooperative to best of his ability and answers some questions appropriately.   - NM from NH  reports that patient has been more confused this past week, defecating on himself, sleeping during the day and staying awake at night, worse appetite, doesn't know how to get back to his room, keeps staring at other residents and then attempts to fight them, has been combative towards staff.  - NH psychiatry recs:  - For severe agitation not responding to behavioral intervention, may give seroquel 50 mg po q6h prn, ativan 2 mg po q6h prn, with escalation to IM if patient refusing PO and remains an imminent danger to self or others.   - If IM antipsychotic is administered, please perform follow-up ECG for QTc monitoring (<500).  - CBC, UA, RVP, toxicology wnl  - CT head negative for any pathology   - Continue with Seroquel, mirtazapine, donepezil, and amantadine  - Can use haloperidol PRN for agitation  - Has not required any PRN medication in the past 48 hours. Possible discharge back to facility tomorrow    Patient was seen and examined today at bedside. He is stable and ready for discharge 63-year-old male, past medical history of COPD, GERD, sciatica, history of alcohol misuse disorder, schizophrenia, hypertension, comes in from [facility] for evaluation of aggression and paranoia.  Nursing staff states that for the past week patient has been more paranoid and wanting to go to the bank to get money.  Also verbalized but did not act out not wanting to fight with another individual.  Nursing home psychiatrist increased his dose of Seroquel from 25 to 50 mg today, but no change in forementioned behavior.  Patient is poor historian unable to obtain clear history.    #Agitation and Paranoia  #Delirium? secondary to psychiatrist illness vs metabolic  #HO schizophrenia  - s/p haldol 2mg in the ED for agitation  - at baseline patient knows his room and knows that he's in a nursing home, typically very calm  - AOx1 to person only today, cooperative to best of his ability and answers some questions appropriately.   - NM from NH  reports that patient has been more confused this past week, defecating on himself, sleeping during the day and staying awake at night, worse appetite, doesn't know how to get back to his room, keeps staring at other residents and then attempts to fight them, has been combative towards staff.  - NH psychiatry recs:  - For severe agitation not responding to behavioral intervention, may give seroquel 50 mg po q6h prn, ativan 2 mg po q6h prn, with escalation to IM if patient refusing PO and remains an imminent danger to self or others.   - If IM antipsychotic is administered, please perform follow-up ECG for QTc monitoring (<500).  - CBC, UA, RVP, toxicology wnl  - CT head negative for any pathology   - Continue with Seroquel, mirtazapine, donepezil, and amantadine  - Can use haloperidol PRN for agitation  - Has not required any PRN medication in the past 48 hours.     Patient was seen and examined today at bedside. He is stable and ready for discharge 63-year-old male, past medical history of COPD, GERD, sciatica, history of alcohol misuse disorder, schizophrenia, hypertension, comes in from [facility] for evaluation of aggression and paranoia.  Nursing staff states that for the past week patient has been more paranoid and wanting to go to the bank to get money.  Also verbalized but did not act out not wanting to fight with another individual.  Nursing home psychiatrist increased his dose of Seroquel from 25 to 50 mg today, but no change in forementioned behavior.  Patient is poor historian unable to obtain clear history.    #Agitation and Paranoia  #Delirium? secondary to psychiatrist illness vs metabolic  #HO schizophrenia  - s/p haldol 2mg in the ED for agitation  - at baseline patient knows his room and knows that he's in a nursing home, typically very calm  - AOx1 to person only today, cooperative to best of his ability and answers some questions appropriately.   - NM from NH  reports that patient has been more confused this past week, defecating on himself, sleeping during the day and staying awake at night, worse appetite, doesn't know how to get back to his room, keeps staring at other residents and then attempts to fight them, has been combative towards staff.  - NH psychiatry recs:  - For severe agitation not responding to behavioral intervention, may give seroquel 50 mg po q6h prn, ativan 2 mg po q6h prn, with escalation to IM if patient refusing PO and remains an imminent danger to self or others.   - If IM antipsychotic is administered, please perform follow-up ECG for QTc monitoring (<500).  - CBC, UA, RVP, toxicology wnl  - CT head negative for any pathology   - Continue with Seroquel, mirtazapine, donepezil, and amantadine  - Can use haloperidol PRN for agitation  - Has not required any PRN medication in the past 48 hours.     Patient was seen and examined today at bedside. He is stable and ready for discharge     3/18 Pt seen and examined independently. No complaints. Feeling well. Denies CP, SOB, AP. Remainder ROS unremarkable. No events as per staff.    Gen: NAD, AAOx2, chronically ill appearing, b/l temporal waisting  CV: S1 S2  Resp: Decreased BS b/l  GI: NT/ND/S +BS  MS: neg c/c/e, +pulses  Neuro: nonfocal except RLE decreased ROM due to chronic Rt hip discomfort    Housestaff spoke to brother. Would benefit from Clonopin taper and outpt f/u w/ neuro and psych    Time spent in completing discharge process and coordinating care 40 minutes.    Discussed with housestaff, nursing, case mgmt 63-year-old male, past medical history of COPD, GERD, sciatica, history of alcohol misuse disorder, schizophrenia, hypertension, comes in from [facility] for evaluation of aggression and paranoia.  Nursing staff states that for the past week patient has been more paranoid and wanting to go to the bank to get money.  Also verbalized but did not act out not wanting to fight with another individual.  Nursing home psychiatrist increased his dose of Seroquel from 25 to 50 mg today, but no change in forementioned behavior.  Patient is poor historian unable to obtain clear history.    #Agitation and Paranoia  #Delirium? secondary to psychiatrist illness vs metabolic  #HO schizophrenia  - s/p haldol 2mg in the ED for agitation  - at baseline patient knows his room and knows that he's in a nursing home, typically very calm  - AOx1 to person only today, cooperative to best of his ability and answers some questions appropriately.   - NM from NH  reports that patient has been more confused this past week, defecating on himself, sleeping during the day and staying awake at night, worse appetite, doesn't know how to get back to his room, keeps staring at other residents and then attempts to fight them, has been combative towards staff.  - NH psychiatry recs:  - For severe agitation not responding to behavioral intervention, may give seroquel 50 mg po q6h prn, ativan 2 mg po q6h prn, with escalation to IM if patient refusing PO and remains an imminent danger to self or others.   - If IM antipsychotic is administered, please perform follow-up ECG for QTc monitoring (<500).  - CBC, UA, RVP, toxicology wnl  - CT head negative for any pathology   - Continue with Seroquel, mirtazapine, donepezil, and amantadine  - Can use haloperidol PRN for agitation  - Has not required any PRN medication in the past 48 hours.     Patient was seen and examined today at bedside. He is stable and ready for discharge     3/18 Pt seen and examined independently. No complaints. Feeling well. Denies CP, SOB, AP. Remainder ROS unremarkable. No events as per staff. No agitation reported.    Gen: NAD, AAOx2, chronically ill appearing, b/l temporal waisting  CV: S1 S2  Resp: Decreased BS b/l  GI: NT/ND/S +BS  MS: neg c/c/e, +pulses  Neuro: nonfocal except RLE decreased ROM due to chronic Rt hip discomfort    Housestaff spoke to brother. Would benefit from Clonopin taper and outpt f/u w/ neuro and psych    Time spent in completing discharge process and coordinating care 40 minutes.    Discussed with housestaff, nursing, case mgmt

## 2024-03-25 DIAGNOSIS — Z78.1 PHYSICAL RESTRAINT STATUS: ICD-10-CM

## 2024-03-25 DIAGNOSIS — Z90.49 ACQUIRED ABSENCE OF OTHER SPECIFIED PARTS OF DIGESTIVE TRACT: ICD-10-CM

## 2024-03-25 DIAGNOSIS — M54.30 SCIATICA, UNSPECIFIED SIDE: ICD-10-CM

## 2024-03-25 DIAGNOSIS — D13.6 BENIGN NEOPLASM OF PANCREAS: ICD-10-CM

## 2024-03-25 DIAGNOSIS — D69.6 THROMBOCYTOPENIA, UNSPECIFIED: ICD-10-CM

## 2024-03-25 DIAGNOSIS — K00.0 ANODONTIA: ICD-10-CM

## 2024-03-25 DIAGNOSIS — F03.911 UNSPECIFIED DEMENTIA, UNSPECIFIED SEVERITY, WITH AGITATION: ICD-10-CM

## 2024-03-25 DIAGNOSIS — Z11.52 ENCOUNTER FOR SCREENING FOR COVID-19: ICD-10-CM

## 2024-03-25 DIAGNOSIS — F25.0 SCHIZOAFFECTIVE DISORDER, BIPOLAR TYPE: ICD-10-CM

## 2024-03-25 DIAGNOSIS — I50.32 CHRONIC DIASTOLIC (CONGESTIVE) HEART FAILURE: ICD-10-CM

## 2024-03-25 DIAGNOSIS — Z96.641 PRESENCE OF RIGHT ARTIFICIAL HIP JOINT: ICD-10-CM

## 2024-03-25 DIAGNOSIS — J44.9 CHRONIC OBSTRUCTIVE PULMONARY DISEASE, UNSPECIFIED: ICD-10-CM

## 2024-03-25 DIAGNOSIS — I11.0 HYPERTENSIVE HEART DISEASE WITH HEART FAILURE: ICD-10-CM

## 2024-03-25 DIAGNOSIS — F05 DELIRIUM DUE TO KNOWN PHYSIOLOGICAL CONDITION: ICD-10-CM

## 2024-03-25 DIAGNOSIS — K21.9 GASTRO-ESOPHAGEAL REFLUX DISEASE WITHOUT ESOPHAGITIS: ICD-10-CM

## 2024-04-08 ENCOUNTER — EMERGENCY (EMERGENCY)
Facility: HOSPITAL | Age: 64
LOS: 0 days | Discharge: HOME | End: 2024-04-08
Attending: EMERGENCY MEDICINE
Payer: MEDICARE

## 2024-04-08 VITALS
TEMPERATURE: 98 F | HEIGHT: 68 IN | OXYGEN SATURATION: 99 % | HEART RATE: 70 BPM | SYSTOLIC BLOOD PRESSURE: 98 MMHG | RESPIRATION RATE: 18 BRPM | DIASTOLIC BLOOD PRESSURE: 60 MMHG

## 2024-04-08 DIAGNOSIS — Z76.89 PERSONS ENCOUNTERING HEALTH SERVICES IN OTHER SPECIFIED CIRCUMSTANCES: Chronic | ICD-10-CM

## 2024-04-08 DIAGNOSIS — K82.9 DISEASE OF GALLBLADDER, UNSPECIFIED: Chronic | ICD-10-CM

## 2024-04-08 DIAGNOSIS — K21.9 GASTRO-ESOPHAGEAL REFLUX DISEASE WITHOUT ESOPHAGITIS: ICD-10-CM

## 2024-04-08 DIAGNOSIS — M25.521 PAIN IN RIGHT ELBOW: ICD-10-CM

## 2024-04-08 DIAGNOSIS — H26.9 UNSPECIFIED CATARACT: Chronic | ICD-10-CM

## 2024-04-08 DIAGNOSIS — Z96.641 PRESENCE OF RIGHT ARTIFICIAL HIP JOINT: Chronic | ICD-10-CM
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
## 2024-04-08 DIAGNOSIS — F20.9 SCHIZOPHRENIA, UNSPECIFIED: ICD-10-CM
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
## 2024-04-08 DIAGNOSIS — F17.200 NICOTINE DEPENDENCE, UNSPECIFIED, UNCOMPLICATED: ICD-10-CM

## 2024-04-08 DIAGNOSIS — J44.9 CHRONIC OBSTRUCTIVE PULMONARY DISEASE, UNSPECIFIED: ICD-10-CM

## 2024-04-08 DIAGNOSIS — M25.551 PAIN IN RIGHT HIP: ICD-10-CM

## 2024-04-08 DIAGNOSIS — S40.021A CONTUSION OF RIGHT UPPER ARM, INITIAL ENCOUNTER: ICD-10-CM

## 2024-04-08 DIAGNOSIS — S52.021A DISPLACED FRACTURE OF OLECRANON PROCESS WITHOUT INTRAARTICULAR EXTENSION OF RIGHT ULNA, INITIAL ENCOUNTER FOR CLOSED FRACTURE: ICD-10-CM

## 2024-04-08 DIAGNOSIS — I10 ESSENTIAL (PRIMARY) HYPERTENSION: ICD-10-CM

## 2024-04-08 LAB
ALBUMIN SERPL ELPH-MCNC: 3.7 G/DL — SIGNIFICANT CHANGE UP (ref 3.5–5.2)
ALP SERPL-CCNC: 73 U/L — SIGNIFICANT CHANGE UP (ref 30–115)
ALT FLD-CCNC: 16 U/L — SIGNIFICANT CHANGE UP (ref 0–41)
ANION GAP SERPL CALC-SCNC: 9 MMOL/L — SIGNIFICANT CHANGE UP (ref 7–14)
AST SERPL-CCNC: 15 U/L — SIGNIFICANT CHANGE UP (ref 0–41)
BASOPHILS # BLD AUTO: 0.02 K/UL — SIGNIFICANT CHANGE UP (ref 0–0.2)
BASOPHILS NFR BLD AUTO: 0.3 % — SIGNIFICANT CHANGE UP (ref 0–1)
BILIRUB SERPL-MCNC: 1.1 MG/DL — SIGNIFICANT CHANGE UP (ref 0.2–1.2)
BUN SERPL-MCNC: 23 MG/DL — HIGH (ref 10–20)
CALCIUM SERPL-MCNC: 8.5 MG/DL — SIGNIFICANT CHANGE UP (ref 8.4–10.5)
CHLORIDE SERPL-SCNC: 107 MMOL/L — SIGNIFICANT CHANGE UP (ref 98–110)
CO2 SERPL-SCNC: 27 MMOL/L — SIGNIFICANT CHANGE UP (ref 17–32)
CREAT SERPL-MCNC: 1 MG/DL — SIGNIFICANT CHANGE UP (ref 0.7–1.5)
EGFR: 84 ML/MIN/1.73M2 — SIGNIFICANT CHANGE UP
EOSINOPHIL # BLD AUTO: 0.08 K/UL — SIGNIFICANT CHANGE UP (ref 0–0.7)
EOSINOPHIL NFR BLD AUTO: 1 % — SIGNIFICANT CHANGE UP (ref 0–8)
GLUCOSE SERPL-MCNC: 97 MG/DL — SIGNIFICANT CHANGE UP (ref 70–99)
HCT VFR BLD CALC: 33.2 % — LOW (ref 42–52)
HGB BLD-MCNC: 10.6 G/DL — LOW (ref 14–18)
IMM GRANULOCYTES NFR BLD AUTO: 0.4 % — HIGH (ref 0.1–0.3)
LYMPHOCYTES # BLD AUTO: 1.28 K/UL — SIGNIFICANT CHANGE UP (ref 1.2–3.4)
LYMPHOCYTES # BLD AUTO: 16.1 % — LOW (ref 20.5–51.1)
MCHC RBC-ENTMCNC: 30.8 PG — SIGNIFICANT CHANGE UP (ref 27–31)
MCHC RBC-ENTMCNC: 31.9 G/DL — LOW (ref 32–37)
MCV RBC AUTO: 96.5 FL — HIGH (ref 80–94)
MONOCYTES # BLD AUTO: 0.93 K/UL — HIGH (ref 0.1–0.6)
MONOCYTES NFR BLD AUTO: 11.7 % — HIGH (ref 1.7–9.3)
NEUTROPHILS # BLD AUTO: 5.63 K/UL — SIGNIFICANT CHANGE UP (ref 1.4–6.5)
NEUTROPHILS NFR BLD AUTO: 70.5 % — SIGNIFICANT CHANGE UP (ref 42.2–75.2)
NRBC # BLD: 0 /100 WBCS — SIGNIFICANT CHANGE UP (ref 0–0)
PLATELET # BLD AUTO: 117 K/UL — LOW (ref 130–400)
PMV BLD: 11.7 FL — HIGH (ref 7.4–10.4)
POTASSIUM SERPL-MCNC: 4.3 MMOL/L — SIGNIFICANT CHANGE UP (ref 3.5–5)
POTASSIUM SERPL-SCNC: 4.3 MMOL/L — SIGNIFICANT CHANGE UP (ref 3.5–5)
PROT SERPL-MCNC: 5.8 G/DL — LOW (ref 6–8)
RBC # BLD: 3.44 M/UL — LOW (ref 4.7–6.1)
RBC # FLD: 13.8 % — SIGNIFICANT CHANGE UP (ref 11.5–14.5)
SODIUM SERPL-SCNC: 143 MMOL/L — SIGNIFICANT CHANGE UP (ref 135–146)
WBC # BLD: 7.97 K/UL — SIGNIFICANT CHANGE UP (ref 4.8–10.8)
WBC # FLD AUTO: 7.97 K/UL — SIGNIFICANT CHANGE UP (ref 4.8–10.8)

## 2024-04-08 PROCEDURE — 73080 X-RAY EXAM OF ELBOW: CPT | Mod: 26,RT

## 2024-04-08 PROCEDURE — 24675 CLTX ULNAR FX PROX W/MNPJ: CPT | Mod: RT

## 2024-04-08 PROCEDURE — 73060 X-RAY EXAM OF HUMERUS: CPT | Mod: 26,RT

## 2024-04-08 PROCEDURE — 72125 CT NECK SPINE W/O DYE: CPT | Mod: MC

## 2024-04-08 PROCEDURE — 72125 CT NECK SPINE W/O DYE: CPT | Mod: 26,MC

## 2024-04-08 PROCEDURE — 71260 CT THORAX DX C+: CPT | Mod: 26,MC

## 2024-04-08 PROCEDURE — 99284 EMERGENCY DEPT VISIT MOD MDM: CPT | Mod: 25

## 2024-04-08 PROCEDURE — 73030 X-RAY EXAM OF SHOULDER: CPT | Mod: 26,RT

## 2024-04-08 PROCEDURE — 74177 CT ABD & PELVIS W/CONTRAST: CPT | Mod: MC

## 2024-04-08 PROCEDURE — 71045 X-RAY EXAM CHEST 1 VIEW: CPT

## 2024-04-08 PROCEDURE — 73080 X-RAY EXAM OF ELBOW: CPT | Mod: 26,RT,77

## 2024-04-08 PROCEDURE — 71045 X-RAY EXAM CHEST 1 VIEW: CPT | Mod: 26

## 2024-04-08 PROCEDURE — 70450 CT HEAD/BRAIN W/O DYE: CPT | Mod: 26,MC

## 2024-04-08 PROCEDURE — 96374 THER/PROPH/DIAG INJ IV PUSH: CPT | Mod: XU

## 2024-04-08 PROCEDURE — 70450 CT HEAD/BRAIN W/O DYE: CPT | Mod: MC

## 2024-04-08 PROCEDURE — 73501 X-RAY EXAM HIP UNI 1 VIEW: CPT | Mod: 26,RT

## 2024-04-08 PROCEDURE — 72170 X-RAY EXAM OF PELVIS: CPT

## 2024-04-08 PROCEDURE — 73060 X-RAY EXAM OF HUMERUS: CPT | Mod: RT

## 2024-04-08 PROCEDURE — 71260 CT THORAX DX C+: CPT | Mod: MC

## 2024-04-08 PROCEDURE — 73080 X-RAY EXAM OF ELBOW: CPT | Mod: RT

## 2024-04-08 PROCEDURE — 73030 X-RAY EXAM OF SHOULDER: CPT | Mod: RT

## 2024-04-08 PROCEDURE — 73090 X-RAY EXAM OF FOREARM: CPT | Mod: RT

## 2024-04-08 PROCEDURE — 36415 COLL VENOUS BLD VENIPUNCTURE: CPT

## 2024-04-08 PROCEDURE — 72170 X-RAY EXAM OF PELVIS: CPT | Mod: 26,XE

## 2024-04-08 PROCEDURE — 82550 ASSAY OF CK (CPK): CPT

## 2024-04-08 PROCEDURE — 73501 X-RAY EXAM HIP UNI 1 VIEW: CPT | Mod: RT

## 2024-04-08 PROCEDURE — 74177 CT ABD & PELVIS W/CONTRAST: CPT | Mod: 26,MC

## 2024-04-08 PROCEDURE — 85025 COMPLETE CBC W/AUTO DIFF WBC: CPT

## 2024-04-08 PROCEDURE — 99285 EMERGENCY DEPT VISIT HI MDM: CPT | Mod: FS

## 2024-04-08 PROCEDURE — 80053 COMPREHEN METABOLIC PANEL: CPT

## 2024-04-08 PROCEDURE — 73090 X-RAY EXAM OF FOREARM: CPT | Mod: 26,RT

## 2024-04-08 RX ORDER — CEFAZOLIN SODIUM 1 G
1000 VIAL (EA) INJECTION ONCE
Refills: 0 | Status: COMPLETED | OUTPATIENT
Start: 2024-04-08 | End: 2024-04-08

## 2024-04-08 RX ADMIN — Medication 100 MILLIGRAM(S): at 20:18

## 2024-04-08 NOTE — ED PROVIDER NOTE - PHYSICAL EXAMINATION
VITAL SIGNS: I have reviewed nursing notes and confirm.  CONSTITUTIONAL:  in no acute distress.  SKIN: Skin exam is warm and dry, Ecchymosis to right arm with visible swelling.  Radial pulse present.  Strength intact  HEAD: Normocephalic; atraumatic.  EYES: PERRL, EOM intact; conjunctiva and sclera clear.  ENT: No nasal discharge; airway clear.   NECK: Supple; non tender.  CARD: S1, S2 normal; no murmurs, gallops, or rubs. Regular rate and rhythm.  RESP: No wheezes, rales or rhonchi. Speaking in full sentences.   ABD: Normal bowel sounds; soft; non-distended; non-tender; No rebound or guarding. No CVA tenderness. ttp right hip  EXT: decreased ROM to right leg, No clubbing, cyanosis or edema.  NEURO: Alert, oriented. Grossly unremarkable. No focal deficits.

## 2024-04-08 NOTE — ED PROVIDER NOTE - ATTENDING APP SHARED VISIT CONTRIBUTION OF CARE
I have reviewed and agree with the mid-level note, except as documented in my note below.    62 y/o male h/o HTN, COPD, GERD, sciatica, schizophrenia, EtOH abuse presents s/p unwitnessed fall, reports rt elbow and rt hip pain, denies head trauma, LOC, paresthesias or other injuries / complaints at present. Old chart reviewed. I have reviewed and agree with the initial nursing note, except as documented in my note.    VSS, awake, alert, Head NC / NT, PERRL / EOMI, no hemotympanum, no dental injury, no lacerations, chest CTAB, chest wall NT, no w/r/r, +S1/S2, RRR, no m/r/g, abdomen soft, NT, ND, +BS, able to voluntarily actively rotate neck 45 degrees left and right on request, no midline spinal tenderness, no CVA tenderness, RUE; no shoulder, wrist or hand tenderness, elbow; + swelling and diffuse ttp, no crepitus, warmth, erythema, induration, fluctuance, lymphangitis or purulence, 2+ radial pulse, otherwise FROM and no bony point tenderness other extremities, alert and oriented to person, place and time, clear speech.

## 2024-04-08 NOTE — ED PROVIDER NOTE - OBJECTIVE STATEMENT
62 y/o male h/o HTN, COPD, GERD, sciatica, schizophrenia, EtOH abuse presents to ED s/p unwitnessed fall, reports rt elbow and rt hip pain, denies head trauma, LOC, paresthesias. spoke with [facility] which sts they saw pt this morning with bruising to right arm. got xray which showed pt had olecranon fracture.

## 2024-04-08 NOTE — ED PROVIDER NOTE - CARE PROVIDERS DIRECT ADDRESSES
,DirectAddress_Unknown,[email or web address]

## 2024-04-08 NOTE — ED PROVIDER NOTE - CLINICAL SUMMARY MEDICAL DECISION MAKING FREE TEXT BOX
Given work up, exam, and history low suspicion for intracranial hemorrhage or trauma, carotid or vertebral artery dissection, intrathoracic trauma (pulmonary contusion, blunt cardiac trauma, pneumothorax, hemothorax, cardiac tamponade, rib fractures), intra abdominal trauma (no liver, spleen, or renal lacerations, doubt hollow viscus injury given soft abdomen on repeat exams, no free air seen, consistently normotensive), extremity dislocation, compartment syndrome.    The Pt was found to have a closed elbow fracture on XR. The Pt is otherwise well appearing, hemodynamically stable, and shows no evidence of neurovascular injury or compartment syndrome. Patient was placed in splint by ortho and will follow up with ortho. POor surgical candidate as per ortho attending. Return precautions discussed.

## 2024-04-08 NOTE — ED PROVIDER NOTE - PROGRESS NOTE DETAILS
consulted ortho for olecranon fx, pt splinted, repeat elbow xray performed. pt to follow up with Dr. Heller or Dr. Browne in 2 weeks.

## 2024-04-08 NOTE — ED PROVIDER NOTE - CARE PLAN
Principal Discharge DX:	Fall  Secondary Diagnosis:	Right hip pain  Secondary Diagnosis:	Elbow fracture, right   1

## 2024-04-08 NOTE — ED PROVIDER NOTE - NSFOLLOWUPINSTRUCTIONS_ED_ALL_ED_FT
Our Emergency Department Referral Coordinators will be reaching out to you in the next 24-48 hours from 9:00am to 5:00pm with a follow up appointment. Please expect a phone call from the hospital in that time frame. If you do not receive a call or if you have any questions or concerns, you can reach them at   [phone]    FOLLOW UP WITH YOUR PRIMARY DOCTOR  FOLLOW UP WITH DR. DOTY OR DR. THOMPSON AT [address] [facility] IN 2 WEEKS YOU CAN CALL [phone]    Olecranon Fracture  An olecranon fracture is a break in one of the bones of your elbow.    Three bones make up your elbow. These include the two bones of your lower arm (ulna and radius) and the single bone of your upper arm (humerus). The tip of your elbow (olecranon) is part of your ulna. You can feel this hard tip when you bend your elbow. It is easily injured because it has very little padding over it.    What are the causes?  The arm and shoulder, showing the tip of the ulna, or the olecranon, in the elbow.  Olecranon fractures often happen after falling on a hard surface with a bent elbow. Other causes include:  A forceful hit to the elbow.  A motor vehicle accident.  Falling onto an outstretched arm.  A forceful twist to the elbow.  What increases the risk?  You are more likely to develop this condition if you:  Play contact sports in which falls on hard surfaces are common, or play sports that involve repetitive motion, such as throwing.  Are an older adult. This is when falls are more common and your bones are more likely to break.  Have thin or weak bones.  What are the signs or symptoms?  Symptoms of this condition include:  Severe pain, especially when you try to move your elbow.  Swelling.  Bruising.  Stiffness.  Pain or tenderness in your elbow when it is touched.  Numbness in your fingers.  How is this diagnosed?  This condition is diagnosed based on a physical exam and X-rays or MRI.    How is this treated?  Treatment depends on the severity and location of the fracture. Many fractures can be treated without surgery. Treatment may include:  Taking pain medicine.  Icing the injury to reduce swelling.  Keeping the elbow still with a cast, splint, or removable brace or sling.  Doing physical therapy exercises to improve movement and gain strength in your elbow.  If the elbow is not stable or if bones are out of place, you may need surgery. You may have pins, wires, screws, or plates inserted into the broken bone. Then, you will need to wear a splint, cast, or sling for a short time. You may also have physical therapy to improve movement and gain strength in your elbow.    Follow these instructions at home:  Medicines    Take over-the-counter and prescription medicines only as told by your health care provider.  Ask your provider if the medicine prescribed to you:  Requires you to avoid driving or using machinery.  Can cause constipation. You may need to take these actions to prevent or treat constipation:  Drink enough fluid to keep your pee (urine) pale yellow.  Take over-the-counter or prescription medicines.  Eat foods that are high in fiber, such as beans, whole grains, and fresh fruits and vegetables.  Limit foods that are high in fat and processed sugars, such as fried or sweet foods.  If you have a removable splint, brace, or sling:    Wear the removable splint, brace, or sling as told by your provider. Remove it only as told by your provider.  Check the skin around the removable splint, brace, or sling every day. Tell your provider about any concerns.  Loosen the removable splint, brace, or sling if your fingers tingle, become numb, or turn cold and blue.  Keep the removable splint, brace, or sling clean and dry.  If you have a nonremovable cast:    Do not put pressure on any part of the cast until it is fully hardened. This may take several hours.  Do not stick anything inside the cast to scratch your skin. Doing that increases your risk of infection.  Check the skin around the cast every day. Tell your provider about any concerns.  You may put lotion on dry skin around the edges of the cast. Do not put lotion on the skin underneath the cast.  Keep the cast clean and dry.  Bathing    Do not take baths, swim, or use a hot tub until your provider approves. Ask your provider if you may take showers. You may only be allowed to take sponge baths.  If the cast, splint, or sling is not waterproof:  Do not let it get wet.  Cover it with a watertight covering when you take a bath or shower.  Managing pain, stiffness, and swelling    Bag of ice on a towel on the skin.  If told, put ice on the injured area.  If you have a removable splint or sling, remove it as told by your provider.  Put ice in a plastic bag.  Place a towel between your skin and the bag or between your cast and the bag.  Leave the ice on for 20 minutes, 2–3 times a day.  If your skin turns bright red, remove the ice right away to prevent skin damage. The risk of damage is higher if you cannot feel pain, heat, or cold.  Move your fingers often to reduce stiffness and swelling.  Raise (elevate) the injured area above the level of your heart while you are sitting or lying down.  Activity    Return to your normal activities as told by your provider. Ask your provider what activities are safe for you.  You may have to avoid lifting. Ask your provider how much you can safely lift.  Do exercises as told by your provider or physical therapist.  General instructions    Do not use any products that contain nicotine or tobacco. These products include cigarettes, chewing tobacco, and vaping devices, such as e-cigarettes. If you need help quitting, ask your provider.  Ask your provider when it is safe to drive if you have a cast, splint, removable brace or sling on your arm.  Keep all follow-up visits. You may need to have an X-ray so that your provider can check if your injury is healing.  Contact a health care provider if:  You have pain that gets worse.  Your hand and fingers swell.  Your cast or splint becomes loose or damaged.  Get help right away if:  You lose feeling in your hand or fingers.  Your hand or fingers get cold or turn pale or blue.  This information is not intended to replace advice given to you by your health care provider. Make sure you discuss any questions you have with your health care provider.

## 2024-04-08 NOTE — CONSULT NOTE ADULT - SUBJECTIVE AND OBJECTIVE BOX
ORTHOPAEDIC SURGERY CONSULT NOTE    Reason for Consult: R olecranon fracture    HPI: 63yMale HTN, COPD, GERD, sciatica, schizophrenia, EtOH abuse presents to ED s/p unwitnessed fall from [facility]. Patient does not participate in history, minimally communicative. Reported R elbow pain to ED on arrival. Unclear history regarding timing of fall from NH. Per staff, they noted increasing R elbow ecchymosis. Xray was performed at NH, which demonstrated olecranon fracture. Staff attempted to schedule outpatient appointment, but were unable to get a new appointment until June, so he presents to ED tonight.     PAST MEDICAL & SURGICAL HISTORY:  Alcohol abuse  HTN (hypertension)  Schizo affective   schizophrenia  Constipation  Sciatic leg pain  Acid reflux disease  History of COPD  Gall bladder disease  S/P hip replacement, right  Capsular cataract of right eye  lap cholecystectomy    Allergies: No Known Allergies    Medications:     PHYSICAL EXAM:  Vital Signs Last 24 Hrs  T(C): 36.7 (08 Apr 2024 17:36), Max: 36.7 (08 Apr 2024 17:36)  T(F): 98 (08 Apr 2024 17:36), Max: 98 (08 Apr 2024 17:36)  HR: 70 (08 Apr 2024 17:36) (70 - 70)  BP: 98/60 (08 Apr 2024 17:36) (98/60 - 98/60)  BP(mean): --  RR: 18 (08 Apr 2024 17:36) (18 - 18)  SpO2: 99% (08 Apr 2024 17:36) (99% - 99%)    Parameters below as of 08 Apr 2024 17:36  Patient On (Oxygen Delivery Method): room air        Physical exam:  Awake, alert  Non-labored breathing    RUE  superficial skin tear over olecranon, pressure bandage in place  Extensive ecchymosis, swelling over elbow and proximal forearm  ROM limited at elbow 2/2 pain and poor participation  ROM wrist, fingers grossly intact  PROM shoulder grossly intact  TTP olecranon  NTTP distal forearm, wrist, hand, shoulder, clavicle  Compartments soft and compressible, no pain w/ passive stretch of digits  SILT distally  AIN/PIN/U intact    Radial pulse 2+, cap refill <2    LUE  Skin intact  No swelling/erythema/ecchymosis noted  No TTP, stepoff, deformity   ROM grossly intact in all joints, all planes  Motor/ sensation grossly intact  WWP distally    BLE  *** right hip incision well healed  No swelling/erythema/ecchymosis noted  No TTP, stepoff, deformity   ROM grossly intact in all joints, all planes  Motor/ sensation grossly intact  WWP distally    Labs:                        10.6   7.97  )-----------( 117      ( 08 Apr 2024 18:21 )             33.2     04-08    143  |  107  |  23<H>  ----------------------------<  97  4.3   |  27  |  1.0    Ca    8.5      08 Apr 2024 18:21    TPro  5.8<L>  /  Alb  3.7  /  TBili  1.1  /  DBili  x   /  AST  15  /  ALT  16  /  AlkPhos  73  04-08        Imaging  -radiographs of R shoulder, humerus, elbow, forearm demonstrate: comminuted, displaced olecranon fracture. Radiocapitellar joint and ulnohumeral joint reduced. No other acute osseous findings  -radiographs of pelvis and R hip demonstrate: s/p R YISEL, components in place, no signs of loosening or lucency. HO formation    A/P: 63y Male with displaced R olecranon fracture. Splinted as below. Given medical comorbidities, poor operative candidate. Will trial non-operative treatment.     - Procedure: patient was placed into well padded anterior splint in semi-extended position    Plan:  - Pain control  - Activity: NWB RUE  - Keep splint C/D/I  - NH may remove splint for wound care, and rewrap with ACE as needed  - Extremity icing/elevation.  - Patient instructed to return to ED if any worsening pain, numbness, tingling, fevers, chills or any other concerning symptoms.  - F/U at [address] in 2 weeks. Please call [phone]. ORTHOPAEDIC SURGERY CONSULT NOTE    Reason for Consult: R olecranon fracture    HPI: 63yMale HTN, COPD, GERD, sciatica, schizophrenia, EtOH abuse presents to ED s/p unwitnessed fall from [facility]. Patient does not participate in history, minimally communicative. Reported R elbow pain to ED on arrival. Unclear history regarding timing of fall from NH. Per staff, they noted increasing R elbow ecchymosis. Xray was performed at NH, which demonstrated olecranon fracture. Staff attempted to schedule outpatient appointment, but were unable to get a new appointment until June, so he presents to ED tonight.     PAST MEDICAL & SURGICAL HISTORY:  Alcohol abuse  HTN (hypertension)  Schizo affective   schizophrenia  Constipation  Sciatic leg pain  Acid reflux disease  History of COPD  Gall bladder disease  S/P hip replacement, right  Capsular cataract of right eye  lap cholecystectomy    Allergies: No Known Allergies    Medications:     PHYSICAL EXAM:  Vital Signs Last 24 Hrs  T(C): 36.7 (08 Apr 2024 17:36), Max: 36.7 (08 Apr 2024 17:36)  T(F): 98 (08 Apr 2024 17:36), Max: 98 (08 Apr 2024 17:36)  HR: 70 (08 Apr 2024 17:36) (70 - 70)  BP: 98/60 (08 Apr 2024 17:36) (98/60 - 98/60)  BP(mean): --  RR: 18 (08 Apr 2024 17:36) (18 - 18)  SpO2: 99% (08 Apr 2024 17:36) (99% - 99%)    Parameters below as of 08 Apr 2024 17:36  Patient On (Oxygen Delivery Method): room air        Physical exam:  Awake, alert  Non-labored breathing    RUE  superficial skin tear over olecranon, pressure bandage in place  Extensive ecchymosis, swelling over elbow and proximal forearm  ROM limited at elbow 2/2 pain and poor participation  ROM wrist, fingers grossly intact  PROM shoulder grossly intact  TTP olecranon  NTTP distal forearm, wrist, hand, shoulder, clavicle  Compartments soft and compressible, no pain w/ passive stretch of digits  SILT distally  AIN/PIN/U intact    Radial pulse 2+, cap refill <2    LUE  Skin intact  No swelling/erythema/ecchymosis noted  No TTP, stepoff, deformity   ROM grossly intact in all joints, all planes  Motor/ sensation grossly intact  WWP distally    BLE  anterior right hip incision well healed  No swelling/erythema/ecchymosis noted  No TTP, stepoff, deformity   Negative log roll, axial load  ROM grossly intact in all joints, all planes  Motor/ sensation grossly intact  WWP distally    Labs:                        10.6   7.97  )-----------( 117      ( 08 Apr 2024 18:21 )             33.2     04-08    143  |  107  |  23<H>  ----------------------------<  97  4.3   |  27  |  1.0    Ca    8.5      08 Apr 2024 18:21    TPro  5.8<L>  /  Alb  3.7  /  TBili  1.1  /  DBili  x   /  AST  15  /  ALT  16  /  AlkPhos  73  04-08        Imaging  -radiographs of R shoulder, humerus, elbow, forearm demonstrate: comminuted, displaced olecranon fracture. Radiocapitellar joint and ulnohumeral joint reduced. No other acute osseous findings  -radiographs of pelvis and R hip demonstrate: s/p R YISEL, components in place, no signs of loosening or lucency. HO formation    A/P: 63y Male with displaced R olecranon fracture. Splinted as below. Given medical comorbidities, poor operative candidate. Will trial non-operative treatment.     - Procedure: patient was placed into well padded anterior splint in semi-extended position    Plan:  - Pain control  - Activity: NWB RUE  - Keep splint C/D/I  - NH may remove splint for wound care, and rewrap with ACE as needed  - Extremity icing/elevation.  - Patient instructed to return to ED if any worsening pain, numbness, tingling, fevers, chills or any other concerning symptoms.  - F/U at [address] [facility] in 2 weeks. Please call [phone].

## 2024-04-08 NOTE — ED PROVIDER NOTE - PROVIDER TOKENS
FREE:[LAST:[follow up with your primary doctor],PHONE:[(   )    -],FAX:[(   )    -],FOLLOWUP:[1-3 Days]],PROVIDER:[TOKEN:[[id number]],FOLLOWUP:[1-3 Days]]

## 2024-04-08 NOTE — ED ADULT TRIAGE NOTE - HEIGHT IN CM
SUBJECTIVE  Patient is a 58-year-old male who presents to the office for a follow-up for bilateral knee patellofemoral osteoarthritis  Patient underwent bilateral knee aspirations 05/2018  Pt states that he has had significant relief of pain from the CSI  Patient states that his right knee now feels full and stiff as it did prior to the aspiration in the past   Patient states that it has been feeling this way for the past month and he is having difficulty sleeping and lying down due to the pain in his right knee  Pt states that he has minimal discomfort in his left knee when compared to the right  Pt states that he no longer uses his knee with the carpet stretcher         ROS:   General: No fever, no chills, no weight loss, no weight gain  HEENT:  No loss of hearing, no nose bleeds, no sore throat  Eyes:  No eye pain, no red eyes, no visual disturbance  Respiratory:  No cough, no shortness of breath, no wheezing  Cardiovascular:  No chest pain, no palpitations, no edema  GI: No abdominal pain, no nausea, no vomiting  Endocrine: No frequent urination, no excessive thirst  Urinary:  No dysuria, no hematuria, no incontinence  Musculoskeletal: see HPI and PE  Skin:  No rash, no wounds  Neurological:  No dizziness, no headache, no numbness  Psychiatric:  No difficulty concentrating, no depression, no suicide thoughts, no anxiety  Review of all other systems is negative    PMH:  Past Medical History:   Diagnosis Date    Denial        PSH:  Past Surgical History:   Procedure Laterality Date    KNEE SURGERY         Medications:  Current Outpatient Medications   Medication Sig Dispense Refill    amLODIPine (NORVASC) 5 mg tablet TAKE 1 TABLET BY MOUTH EVERY DAY 30 tablet 0    naproxen (EC NAPROSYN) 500 MG EC tablet Take 1 tablet (500 mg total) by mouth 2 (two) times a day with meals 60 tablet 0    naproxen (NAPROSYN) 500 mg tablet TAKE 1 TABLET BY MOUTH TWICE A DAY WITH MEALS 60 tablet 0     No current facility-administered medications for this visit  Allergies: Allergies   Allergen Reactions    Penicillins        Family History:  Family History   Problem Relation Age of Onset    Diabetes Mother     Hypertension Mother     Diabetes Father     Hypertension Father        Social History:  Social History     Occupational History    Not on file   Tobacco Use    Smoking status: Never Smoker    Smokeless tobacco: Never Used   Substance and Sexual Activity    Alcohol use: Yes     Frequency: Monthly or less     Drinks per session: 1 or 2     Binge frequency: Never     Comment: social    Drug use: No    Sexual activity: Yes     Partners: Female       Physical Exam:  General :  Alert, cooperative, no distress, appears stated age  Blood pressure (!) 157/106, pulse 65, weight 99 7 kg (219 lb 12 8 oz)  Head:  Normocephalic, without obvious abnormality, atraumatic   Eyes:  Conjunctiva/corneas clear, EOM's intact,   Ears: Both ears normal appearance, no hearing deficits  Nose: Nares normal, septum midline, no drainage    Neck: Supple,  trachea midline, no adenopathy, no tenderness, no mass   Back:   Symmetric, no curvature, ROM normal, no tenderness   Lungs:   Respirations unlabored   Chest Wall:  No tenderness or deformity   Extremities: Extremities normal, atraumatic, no cyanosis or edema      Pulses: 2+ and symmetric   Skin: Skin color, texture, turgor normal, no rashes or lesions      Neurologic: Normal           Right Knee Exam     Tenderness   The patient is experiencing tenderness in the medial joint line (No lateral joint line or pes anserine tenderness)  Range of Motion   The patient has normal right knee ROM      Tests   Varus: negative Valgus: negative  Lachman:  Anterior - negative    Posterior - negative  Drawer:  Posterior - negative    Other   Erythema: absent  Swelling: mild    Comments:  NVID      Left Knee Exam     Tenderness   The patient is experiencing tenderness in the medial joint line (No lateral joint line or pes anserine tenderness)  Tests   Varus: negative Valgus: negative  Lachman:  Anterior - negative    Posterior - negative  Drawer:  Posterior - negative    Other   Swelling: mild  Effusion: no effusion present    Comments:  NVID              Imaging Studies: The following imaging studies were reviewed in office today  My findings are noted  No new images reviewed        Assessment  Encounter Diagnoses   Name Primary?  Bilateral knee effusions Yes    Patellofemoral arthritis of left knee     Patellofemoral arthritis of right knee          Plan:  Large joint arthrocentesis: bilateral knee  Date/Time: 1/29/2020 3:08 PM  Consent given by: patient  Site marked: site marked  Timeout: Immediately prior to procedure a time out was called to verify the correct patient, procedure, equipment, support staff and site/side marked as required   Supporting Documentation  Indications: pain   Procedure Details  Location: knee - bilateral knee  Preparation: Patient was prepped and draped in the usual sterile fashion  Ultrasound guidance: no    Medications (Right): 2 mL bupivacaine 0 25 %; 2 mL methylPREDNISolone acetate 40 mg/mLMedications (Left): 2 mL bupivacaine 0 25 %; 2 mL methylPREDNISolone acetate 40 mg/mL   Aspirate amount (Left): 9 mL  Aspirate (Left): clear    Patient tolerance: patient tolerated the procedure well with no immediate complications  Dressing:  Sterile dressing applied        Bilaterally aspiration was performed today the right knee had minimal clear drainage, less than half a ml  The left knee was successfully aspirated obtaining 9 cc of clear fluid  Bilateral knee cortisone steroid injections were administered without complications  Patient was advised to call the office if he has any questions concerns or would like repeat evaluation    Scribe Attestation    I,:   Vanessa Blanco am acting as a scribe while in the presence of the attending physician : I,:   Neha James MD personally performed the services described in this documentation    as scribed in my presence : 172.72

## 2024-04-08 NOTE — ED ADULT NURSE NOTE - OBJECTIVE STATEMENT
62 y/o male BIBA from NH s/p unwitnessed fall with right acute  fracture at the base of the olecranon

## 2024-04-08 NOTE — ED PROVIDER NOTE - DISCUSSED CASE WITH MULTISELECT OPTIONS
Detail Level: Simple Price (Do Not Change): 0.00 Instructions: This plan will send the code FBSE to the PM system.  DO NOT or CHANGE the price. Consultant

## 2024-04-08 NOTE — ED ADULT NURSE NOTE - CHIEF COMPLAINT QUOTE
Pt BIBA from [facility] s/p unwitnessed fall with right "acute  fracture at the base of the olecranon"  Not on anticoags

## 2024-04-08 NOTE — ED PROVIDER NOTE - PATIENT PORTAL LINK FT
You can access the FollowMyHealth Patient Portal offered by [facility] by registering at the following website: [email or web address]. By joining [employer]’s FollowMyHealth portal, you will also be able to view your health information using other applications (apps) compatible with our system.

## 2024-04-12 ENCOUNTER — APPOINTMENT (OUTPATIENT)
Dept: ORTHOPEDIC SURGERY | Facility: CLINIC | Age: 64
End: 2024-04-12
Payer: MEDICARE

## 2024-04-12 PROCEDURE — 99202 OFFICE O/P NEW SF 15 MIN: CPT

## 2024-04-12 NOTE — ASSESSMENT
[FreeTextEntry1] : Patient is a right-sided intra-articular olecranon fracture.  The patient will require internal fixation of the same.  He will need medical optimization prior to surgery.  I discussed the situation with his brother Leonid.  Phone #[phone] would like to be contacted only after 2:00.  The patient and his brother are agreeable to surgical intervention for internal fixation.  Nonoperative treatment was discussed as well.  Need for hardware removal was discussed.  The postoperative course including stitches and immobilization were discussed.  Consult is filled out regarding this for the facility.

## 2024-04-12 NOTE — DATA REVIEWED
[FreeTextEntry1] : Radiographs from the outside reviewed films are reviewed documenting a right displaced comminuted fragmented olecranon fracture

## 2024-04-12 NOTE — HISTORY OF PRESENT ILLNESS
[de-identified] : 63-year-old male comes in the office for evaluation regarding his right elbow.  Patient had a fall resulting in injury to his right elbow.  He resides at [facility].  And he comes in for the evaluation today.  He was seen in the emergency room and splinted.  He does not ambulate much.

## 2024-04-19 ENCOUNTER — OUTPATIENT (OUTPATIENT)
Dept: OUTPATIENT SERVICES | Facility: HOSPITAL | Age: 64
LOS: 1 days | End: 2024-04-19
Payer: MEDICARE

## 2024-04-19 VITALS
RESPIRATION RATE: 19 BRPM | HEIGHT: 71 IN | SYSTOLIC BLOOD PRESSURE: 139 MMHG | OXYGEN SATURATION: 100 % | WEIGHT: 158.95 LBS | DIASTOLIC BLOOD PRESSURE: 74 MMHG | HEART RATE: 68 BPM | TEMPERATURE: 98 F

## 2024-04-19 DIAGNOSIS — H26.9 UNSPECIFIED CATARACT: Chronic | ICD-10-CM

## 2024-04-19 DIAGNOSIS — Z96.641 PRESENCE OF RIGHT ARTIFICIAL HIP JOINT: Chronic | ICD-10-CM

## 2024-04-19 DIAGNOSIS — K82.9 DISEASE OF GALLBLADDER, UNSPECIFIED: Chronic | ICD-10-CM

## 2024-04-19 DIAGNOSIS — S52.031A DISPLACED FRACTURE OF OLECRANON PROCESS WITH INTRAARTICULAR EXTENSION OF RIGHT ULNA, INITIAL ENCOUNTER FOR CLOSED FRACTURE: ICD-10-CM

## 2024-04-19 DIAGNOSIS — Z01.818 ENCOUNTER FOR OTHER PREPROCEDURAL EXAMINATION: ICD-10-CM

## 2024-04-19 DIAGNOSIS — S52.031D DISPLACED FRACTURE OF OLECRANON PROCESS WITH INTRAARTICULAR EXTENSION OF RIGHT ULNA, SUBSEQUENT ENCOUNTER FOR CLOSED FRACTURE WITH ROUTINE HEALING: ICD-10-CM

## 2024-04-19 DIAGNOSIS — Z76.89 PERSONS ENCOUNTERING HEALTH SERVICES IN OTHER SPECIFIED CIRCUMSTANCES: Chronic | ICD-10-CM

## 2024-04-19 LAB
BASOPHILS # BLD AUTO: 0.02 K/UL — SIGNIFICANT CHANGE UP (ref 0–0.2)
BASOPHILS NFR BLD AUTO: 0.3 % — SIGNIFICANT CHANGE UP (ref 0–1)
EOSINOPHIL # BLD AUTO: 0.08 K/UL — SIGNIFICANT CHANGE UP (ref 0–0.7)
EOSINOPHIL NFR BLD AUTO: 1.4 % — SIGNIFICANT CHANGE UP (ref 0–8)
HCT VFR BLD CALC: 33.3 % — LOW (ref 42–52)
HGB BLD-MCNC: 10.7 G/DL — LOW (ref 14–18)
IMM GRANULOCYTES NFR BLD AUTO: 0.3 % — SIGNIFICANT CHANGE UP (ref 0.1–0.3)
LYMPHOCYTES # BLD AUTO: 0.67 K/UL — LOW (ref 1.2–3.4)
LYMPHOCYTES # BLD AUTO: 11.6 % — LOW (ref 20.5–51.1)
MCHC RBC-ENTMCNC: 30.7 PG — SIGNIFICANT CHANGE UP (ref 27–31)
MCHC RBC-ENTMCNC: 32.1 G/DL — SIGNIFICANT CHANGE UP (ref 32–37)
MCV RBC AUTO: 95.7 FL — HIGH (ref 80–94)
MONOCYTES # BLD AUTO: 0.45 K/UL — SIGNIFICANT CHANGE UP (ref 0.1–0.6)
MONOCYTES NFR BLD AUTO: 7.8 % — SIGNIFICANT CHANGE UP (ref 1.7–9.3)
NEUTROPHILS # BLD AUTO: 4.53 K/UL — SIGNIFICANT CHANGE UP (ref 1.4–6.5)
NEUTROPHILS NFR BLD AUTO: 78.6 % — HIGH (ref 42.2–75.2)
NRBC # BLD: 0 /100 WBCS — SIGNIFICANT CHANGE UP (ref 0–0)
PLATELET # BLD AUTO: 171 K/UL — SIGNIFICANT CHANGE UP (ref 130–400)
PMV BLD: 11.2 FL — HIGH (ref 7.4–10.4)
RBC # BLD: 3.48 M/UL — LOW (ref 4.7–6.1)
RBC # FLD: 13.3 % — SIGNIFICANT CHANGE UP (ref 11.5–14.5)
WBC # BLD: 5.77 K/UL — SIGNIFICANT CHANGE UP (ref 4.8–10.8)
WBC # FLD AUTO: 5.77 K/UL — SIGNIFICANT CHANGE UP (ref 4.8–10.8)

## 2024-04-19 PROCEDURE — 85025 COMPLETE CBC W/AUTO DIFF WBC: CPT

## 2024-04-19 PROCEDURE — 99214 OFFICE O/P EST MOD 30 MIN: CPT | Mod: 25

## 2024-04-19 PROCEDURE — 36415 COLL VENOUS BLD VENIPUNCTURE: CPT

## 2024-04-19 RX ORDER — DONEPEZIL HYDROCHLORIDE 10 MG/1
1 TABLET, FILM COATED ORAL
Refills: 0 | DISCHARGE

## 2024-04-19 RX ORDER — RIFAXIMIN 200 MG/1
1 TABLET ORAL
Qty: 0 | Refills: 0 | DISCHARGE

## 2024-04-19 NOTE — H&P PST ADULT - NSICDXPASTMEDICALHX_GEN_ALL_CORE_FT
PAST MEDICAL HISTORY:  Acid reflux disease     Alcohol abuse LAST DRINK APPROX ONE YEAR AGO    Constipation     History of COPD     History of tremor     HTN (hypertension)     Schizo affective schizophrenia DEPRESSSION ANXIETY    Sciatic leg pain RIGHT     PAST MEDICAL HISTORY:  Acid reflux disease     Alcohol abuse LAST DRINK APPROX ONE YEAR AGO    Constipation     H/O Parkinson's disease     History of COPD     History of tremor     HTN (hypertension)     Schizo affective schizophrenia DEPRESSSION ANXIETY    Sciatic leg pain RIGHT     PAST MEDICAL HISTORY:  Acid reflux disease     Alcohol abuse LAST DRINK APPROX ONE YEAR AGO    Cirrhosis, alcoholic     Constipation     H/O Parkinson's disease     History of COPD     History of tremor     HTN (hypertension)     Schizo affective schizophrenia DEPRESSSION ANXIETY    Sciatic leg pain RIGHT

## 2024-04-19 NOTE — H&P PST ADULT - HISTORY OF PRESENT ILLNESS
63 year old male Patient now presents for open reduction internal fixation right olecranon. 4/12/24 patient s/p fall resulting in a right displaced communuted fragmented olecranon fx being operated on 4/24/24 with Dr. Duke.     PATIENT CURRENTLY DENIES CHEST PAIN  SHORTNESS OF BREATH  PALPITATIONS,  DYSURIA, OR UPPER RESPIRATORY INFECTION IN PAST 2 WEEKS   63 year old male Patient now presents for open reduction internal fixation right olecranon. 4/12/24 patient s/p fall resulting in a right displaced comminuted fragmented olecranon fx being operated on 4/24/24 with Dr. Duke.     Pain is present to right elbow. Pain feels "pulsating." Pain is 7/10. Pain radiates down to fingers. Pain started after fall on 4/12/24. Tylenol does not improve pain. Pain is worsened with movement.     PATIENT CURRENTLY DENIES CHEST PAIN  SHORTNESS OF BREATH  PALPITATIONS,  DYSURIA, OR UPPER RESPIRATORY INFECTION IN PAST 2 WEEKS  Patient verbalized understanding of instructions and was given the opportunity to ask questions and have them answered.  As per patient, this is their complete medical and surgical history, including medications both prescribed or over the counter.  written and verbal instructions with teach back on chlorhexidine shampoo provided,  pt verbalized understanding with returned demonstration    Anesthesia Alert  NO--Difficult Airway  NO--History of neck surgery or radiation  NO--Limited ROM of neck  NO--History of Malignant hyperthermia  NO--Personal or family history of Pseudocholinesterase deficiency  NO--Prior Anesthesia Complication  NO--Latex Allergy  NO--Loose teeth  NO--History of Rheumatoid Arthritis  NO--HAILEY  NO--Bleeding risk  NO--Other_____    Mallampati airway: Class II     Duke Activity Status Index (DASI) from [employer]  on 4/19/2024  ** All calculations should be rechecked by clinician prior to use **    RESULT SUMMARY:  7.2 points  The higher the score (maximum 58.2), the higher the functional status.    3.63 METs        INPUTS:  Take care of self —> 0 = No  Walk indoors —> 1.75 = Yes  Walk 1&ndash;2 blocks on level ground —> 2.75 = Yes  Climb a flight of stairs or walk up a hill —> 0 = No  Run a short distance —> 0 = No  Do light work around the house —> 2.7 = Yes  Do moderate work around the house —> 0 = No  Do heavy work around the house —> 0 = No  Do yardwork —> 0 = No  Have sexual relations —> 0 = No  Participate in moderate recreational activities —> 0 = No  Participate in strenuous sports —> 0 = No    Revised Cardiac Risk Index for Pre-Operative Risk from [employer]  on 4/19/2024  ** All calculations should be rechecked by clinician prior to use **    RESULT SUMMARY:  0 points  Class I Risk    3.9 %  30-day risk of death, MI, or cardiac arrest    From Duceppe 2017. These numbers are higher than those from the original study (Pancho 1999). See Evidence for details.      INPUTS:  Elevated-risk surgery —> 0 = No  History of ischemic heart disease —> 0 = No  History of congestive heart failure —> 0 = No  History of cerebrovascular disease —> 0 = No  Pre-operative treatment with insulin —> 0 = No  Pre-operative creatinine >2 mg/dL / 176.8 µmol/L —> 0 = No

## 2024-04-19 NOTE — H&P PST ADULT - NEUROLOGICAL
AAOx3, generalized tremors noted intermittently/normal/cranial nerves II-XII intact/sensation intact/responds to verbal commands

## 2024-04-19 NOTE — H&P PST ADULT - ADDITIONAL PE
Patient has missing teeth and possible cavities, teeth are stable in position (no lose teeth present)

## 2024-04-19 NOTE — H&P PST ADULT - REASON FOR ADMISSION
63 year old male Patient now presents for open reduction internal fixation right olecranon. 63 year old male Patient now presents for open reduction internal fixation right olecranon

## 2024-04-19 NOTE — H&P PST ADULT - MUSCULOSKELETAL
decreased ROM secondary to gout right foot, Decreased ROM right elbow currently in a soft cast, right hand is swollen (non pitting), cap refill <3 sec, hand is warm, to otuch, pink and sensation is intact, +pulse

## 2024-04-20 DIAGNOSIS — Z01.818 ENCOUNTER FOR OTHER PREPROCEDURAL EXAMINATION: ICD-10-CM

## 2024-04-20 DIAGNOSIS — S52.031D DISPLACED FRACTURE OF OLECRANON PROCESS WITH INTRAARTICULAR EXTENSION OF RIGHT ULNA, SUBSEQUENT ENCOUNTER FOR CLOSED FRACTURE WITH ROUTINE HEALING: ICD-10-CM

## 2024-04-23 NOTE — ASU PATIENT PROFILE, ADULT - FALL HARM RISK - HARM RISK INTERVENTIONS

## 2024-04-23 NOTE — ASU PATIENT PROFILE, ADULT - NSICDXPASTMEDICALHX_GEN_ALL_CORE_FT
PAST MEDICAL HISTORY:  Acid reflux disease     Alcohol abuse LAST DRINK APPROX ONE YEAR AGO    Cirrhosis, alcoholic     Constipation     H/O Parkinson's disease     History of COPD     History of tremor     HTN (hypertension)     Schizo affective schizophrenia DEPRESSSION ANXIETY    Sciatic leg pain RIGHT    Thrombocytopenia

## 2024-04-23 NOTE — ASU PATIENT PROFILE, ADULT - NS PRO PT RIGHT SUPPORT PERSON
07/05/18 1125 Gemma Jackson
1120-PATIENT ARRIVED TO PACU ON 6L MASK O2 % PATIENT REACTIVE
OPENS EYES. RR EVEN. SSR.
 
1125-PATIENT AWAKE MOANING DENIES PAIN OR NAUSEA.
PT ALERT, ORIENTED AND SUPPORTED BY HER . THEY BOTH FELT INFORMED AND
HAD FEW QUESTIONS. PT DID REQUEST PRAYER, WILL FOLLOW AS NEEDED
Yes

## 2024-04-24 ENCOUNTER — TRANSCRIPTION ENCOUNTER (OUTPATIENT)
Age: 64
End: 2024-04-24

## 2024-04-24 ENCOUNTER — OUTPATIENT (OUTPATIENT)
Dept: OUTPATIENT SERVICES | Facility: HOSPITAL | Age: 64
LOS: 1 days | Discharge: HOME | End: 2024-04-24
Payer: MEDICARE

## 2024-04-24 ENCOUNTER — APPOINTMENT (OUTPATIENT)
Dept: ORTHOPEDIC SURGERY | Facility: AMBULATORY SURGERY CENTER | Age: 64
End: 2024-04-24

## 2024-04-24 VITALS
RESPIRATION RATE: 12 BRPM | OXYGEN SATURATION: 95 % | DIASTOLIC BLOOD PRESSURE: 81 MMHG | HEART RATE: 74 BPM | SYSTOLIC BLOOD PRESSURE: 136 MMHG

## 2024-04-24 VITALS
OXYGEN SATURATION: 99 % | SYSTOLIC BLOOD PRESSURE: 154 MMHG | TEMPERATURE: 98 F | HEART RATE: 57 BPM | WEIGHT: 158.95 LBS | RESPIRATION RATE: 18 BRPM | HEIGHT: 71 IN | DIASTOLIC BLOOD PRESSURE: 75 MMHG

## 2024-04-24 DIAGNOSIS — S52.031A DISPLACED FRACTURE OF OLECRANON PROCESS WITH INTRAARTICULAR EXTENSION OF RIGHT ULNA, INITIAL ENCOUNTER FOR CLOSED FRACTURE: ICD-10-CM

## 2024-04-24 DIAGNOSIS — Z76.89 PERSONS ENCOUNTERING HEALTH SERVICES IN OTHER SPECIFIED CIRCUMSTANCES: Chronic | ICD-10-CM

## 2024-04-24 DIAGNOSIS — H26.9 UNSPECIFIED CATARACT: Chronic | ICD-10-CM

## 2024-04-24 DIAGNOSIS — K82.9 DISEASE OF GALLBLADDER, UNSPECIFIED: Chronic | ICD-10-CM

## 2024-04-24 DIAGNOSIS — Z96.641 PRESENCE OF RIGHT ARTIFICIAL HIP JOINT: Chronic | ICD-10-CM

## 2024-04-24 PROCEDURE — C9399: CPT

## 2024-04-24 PROCEDURE — 24685 OPTX ULNAR FX PROX END W/FIX: CPT | Mod: RT

## 2024-04-24 PROCEDURE — C1713: CPT

## 2024-04-24 RX ORDER — KETOCONAZOLE 20 MG/G
1 AEROSOL, FOAM TOPICAL
Refills: 0 | DISCHARGE

## 2024-04-24 RX ORDER — FINASTERIDE 5 MG/1
1 TABLET, FILM COATED ORAL
Refills: 0 | DISCHARGE

## 2024-04-24 RX ORDER — TAMSULOSIN HYDROCHLORIDE 0.4 MG/1
1 CAPSULE ORAL
Refills: 0 | DISCHARGE

## 2024-04-24 RX ORDER — MIRTAZAPINE 45 MG/1
1 TABLET, ORALLY DISINTEGRATING ORAL
Refills: 0 | DISCHARGE

## 2024-04-24 RX ORDER — PSYLLIUM SEED (WITH DEXTROSE)
3.4 POWDER (GRAM) ORAL
Refills: 0 | DISCHARGE

## 2024-04-24 RX ORDER — ACETAMINOPHEN 500 MG
2 TABLET ORAL
Qty: 0 | Refills: 0 | DISCHARGE

## 2024-04-24 RX ORDER — METRONIDAZOLE 7.5 MG/G
1 GEL VAGINAL
Refills: 0 | DISCHARGE

## 2024-04-24 RX ORDER — SODIUM CHLORIDE 9 MG/ML
500 INJECTION, SOLUTION INTRAVENOUS
Refills: 0 | Status: DISCONTINUED | OUTPATIENT
Start: 2024-04-24 | End: 2024-04-24

## 2024-04-24 RX ORDER — OMEPRAZOLE 10 MG/1
2 CAPSULE, DELAYED RELEASE ORAL
Refills: 0 | DISCHARGE

## 2024-04-24 RX ORDER — LANOLIN ALCOHOL/MO/W.PET/CERES
1 CREAM (GRAM) TOPICAL
Refills: 0 | DISCHARGE

## 2024-04-24 RX ORDER — AMANTADINE HCL 100 MG
1 CAPSULE ORAL
Refills: 0 | DISCHARGE

## 2024-04-24 RX ORDER — CLONAZEPAM 1 MG
1 TABLET ORAL
Refills: 0 | DISCHARGE

## 2024-04-24 RX ORDER — LABETALOL HCL 100 MG
1 TABLET ORAL
Qty: 0 | Refills: 0 | DISCHARGE

## 2024-04-24 RX ORDER — HYDROMORPHONE HYDROCHLORIDE 2 MG/ML
0.5 INJECTION INTRAMUSCULAR; INTRAVENOUS; SUBCUTANEOUS
Refills: 0 | Status: DISCONTINUED | OUTPATIENT
Start: 2024-04-24 | End: 2024-04-24

## 2024-04-24 RX ORDER — ONDANSETRON 8 MG/1
4 TABLET, FILM COATED ORAL ONCE
Refills: 0 | Status: DISCONTINUED | OUTPATIENT
Start: 2024-04-24 | End: 2024-04-24

## 2024-04-24 RX ORDER — DIPHENHYDRAMINE HCL 50 MG
50 CAPSULE ORAL
Refills: 0 | DISCHARGE

## 2024-04-24 RX ORDER — LACTULOSE 10 G/15ML
30 SOLUTION ORAL
Qty: 0 | Refills: 0 | DISCHARGE

## 2024-04-24 RX ORDER — QUETIAPINE FUMARATE 200 MG/1
1 TABLET, FILM COATED ORAL
Refills: 0 | DISCHARGE

## 2024-04-24 RX ADMIN — SODIUM CHLORIDE 100 MILLILITER(S): 9 INJECTION, SOLUTION INTRAVENOUS at 14:39

## 2024-04-24 NOTE — BRIEF OPERATIVE NOTE - NSICDXBRIEFPREOP_GEN_ALL_CORE_FT
PRE-OP DIAGNOSIS:  Intra-articular fracture of olecranon process of right ulna 24-Apr-2024 15:22:37  Breezy Duke

## 2024-04-24 NOTE — BRIEF OPERATIVE NOTE - NSICDXBRIEFPOSTOP_GEN_ALL_CORE_FT
POST-OP DIAGNOSIS:  Intra-articular fracture of olecranon process of right ulna 24-Apr-2024 15:22:56  Breezy Duke

## 2024-04-24 NOTE — ASU PREOP CHECKLIST - 1.
[facility]. Rn spoke with DANIKA Parker unit manager regarding medications. Last took meds at 0700.

## 2024-04-24 NOTE — ASU DISCHARGE PLAN (ADULT/PEDIATRIC) - CARE PROVIDER_API CALL
Breezy Duke  Orthopaedic Surgery  [address] [place] [address]  Phone: [phone]  Fax: [phone]  Established Patient  Follow Up Time:

## 2024-04-30 DIAGNOSIS — S52.021A DISPLACED FRACTURE OF OLECRANON PROCESS WITHOUT INTRAARTICULAR EXTENSION OF RIGHT ULNA, INITIAL ENCOUNTER FOR CLOSED FRACTURE: ICD-10-CM

## 2024-04-30 DIAGNOSIS — Z96.641 PRESENCE OF RIGHT ARTIFICIAL HIP JOINT: ICD-10-CM
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
## 2024-04-30 DIAGNOSIS — J44.9 CHRONIC OBSTRUCTIVE PULMONARY DISEASE, UNSPECIFIED: ICD-10-CM
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
## 2024-04-30 DIAGNOSIS — F20.9 SCHIZOPHRENIA, UNSPECIFIED: ICD-10-CM

## 2024-04-30 DIAGNOSIS — F41.9 ANXIETY DISORDER, UNSPECIFIED: ICD-10-CM

## 2024-04-30 DIAGNOSIS — F32.A DEPRESSION, UNSPECIFIED: ICD-10-CM

## 2024-04-30 DIAGNOSIS — Z87.891 PERSONAL HISTORY OF NICOTINE DEPENDENCE: ICD-10-CM

## 2024-04-30 DIAGNOSIS — K21.9 GASTRO-ESOPHAGEAL REFLUX DISEASE WITHOUT ESOPHAGITIS: ICD-10-CM

## 2024-04-30 DIAGNOSIS — I10 ESSENTIAL (PRIMARY) HYPERTENSION: ICD-10-CM

## 2024-05-01 ENCOUNTER — RESULT CHARGE (OUTPATIENT)
Age: 64
End: 2024-05-01

## 2024-05-01 ENCOUNTER — APPOINTMENT (OUTPATIENT)
Dept: ORTHOPEDIC SURGERY | Facility: CLINIC | Age: 64
End: 2024-05-01
Payer: MEDICARE

## 2024-05-01 VITALS — BODY MASS INDEX: 24.34 KG/M2 | WEIGHT: 170 LBS | HEIGHT: 70 IN

## 2024-05-01 PROCEDURE — 99024 POSTOP FOLLOW-UP VISIT: CPT

## 2024-05-01 PROCEDURE — 73080 X-RAY EXAM OF ELBOW: CPT | Mod: RT

## 2024-05-02 PROBLEM — K70.30 ALCOHOLIC CIRRHOSIS OF LIVER WITHOUT ASCITES: Chronic | Status: ACTIVE | Noted: 2024-04-19

## 2024-05-02 PROBLEM — D69.6 THROMBOCYTOPENIA, UNSPECIFIED: Chronic | Status: ACTIVE | Noted: 2024-04-24

## 2024-05-31 ENCOUNTER — APPOINTMENT (OUTPATIENT)
Dept: ORTHOPEDIC SURGERY | Facility: CLINIC | Age: 64
End: 2024-05-31
Payer: MEDICARE

## 2024-05-31 PROBLEM — Z86.69 PERSONAL HISTORY OF OTHER DISEASES OF THE NERVOUS SYSTEM AND SENSE ORGANS: Chronic | Status: ACTIVE | Noted: 2024-04-19

## 2024-05-31 PROCEDURE — 99024 POSTOP FOLLOW-UP VISIT: CPT

## 2024-05-31 PROCEDURE — 73080 X-RAY EXAM OF ELBOW: CPT | Mod: RT

## 2024-05-31 NOTE — ASSESSMENT
[FreeTextEntry1] : Patient a right side olecranon fracture.  He underwent surgical repair.  He will continue with therapy.  He can weight-bear through the elbow.  He can discontinue the splint.  She will see us in a month with a repeat radiograph of the right elbow and if everything is good be discharged from care

## 2024-05-31 NOTE — PHYSICAL EXAM
[de-identified] : Patient has a well-healed posterior wound.  There is no drainage.  He has good range of motion.  Good supination pronation.  Mild swelling

## 2024-05-31 NOTE — HISTORY OF PRESENT ILLNESS
[de-identified] : 63-year-old male status post internal fixation right olecranon fracture.  He still living in his group home.  He is in a splint.  No complaints

## 2024-05-31 NOTE — DATA REVIEWED
[FreeTextEntry1] : Radiographs 3 views of the right elbow reviewed showing good position alignment and healing of the right side olecranon fracture with hardware in place

## 2024-06-17 NOTE — ED PROVIDER NOTE - CARE PROVIDER_API CALL
Pt aware of results and instructions. No further questions at this time.      follow up with your primary doctor,   Phone: (   )    -  Fax: (   )    -  Follow Up Time: 1-3 Days    Yoel Heller  Orthopaedic Surgery  [address] [place] [address]  Phone: [phone]  Fax: [phone]  Follow Up Time: 1-3 Days

## 2024-07-02 ENCOUNTER — APPOINTMENT (OUTPATIENT)
Dept: ORTHOPEDIC SURGERY | Facility: CLINIC | Age: 64
End: 2024-07-02
Payer: MEDICARE

## 2024-07-02 DIAGNOSIS — S52.031A DISPLACED FRACTURE OF OLECRANON PROCESS WITH INTRAARTICULAR EXTENSION OF RIGHT ULNA, INITIAL ENCOUNTER FOR CLOSED FRACTURE: ICD-10-CM

## 2024-07-02 PROCEDURE — 73070 X-RAY EXAM OF ELBOW: CPT | Mod: RT

## 2024-07-02 PROCEDURE — 99024 POSTOP FOLLOW-UP VISIT: CPT

## 2024-07-10 ENCOUNTER — APPOINTMENT (OUTPATIENT)
Dept: ORTHOPEDIC SURGERY | Facility: CLINIC | Age: 64
End: 2024-07-10

## 2024-07-10 ENCOUNTER — OUTPATIENT (OUTPATIENT)
Dept: OUTPATIENT SERVICES | Facility: HOSPITAL | Age: 64
LOS: 1 days | End: 2024-07-10
Payer: MEDICARE

## 2024-07-10 DIAGNOSIS — H26.9 UNSPECIFIED CATARACT: Chronic | ICD-10-CM

## 2024-07-10 DIAGNOSIS — K82.9 DISEASE OF GALLBLADDER, UNSPECIFIED: Chronic | ICD-10-CM

## 2024-07-10 DIAGNOSIS — Z00.00 ENCOUNTER FOR GENERAL ADULT MEDICAL EXAMINATION WITHOUT ABNORMAL FINDINGS: ICD-10-CM

## 2024-07-10 DIAGNOSIS — Z96.641 PRESENCE OF RIGHT ARTIFICIAL HIP JOINT: Chronic | ICD-10-CM

## 2024-07-10 DIAGNOSIS — Z76.89 PERSONS ENCOUNTERING HEALTH SERVICES IN OTHER SPECIFIED CIRCUMSTANCES: Chronic | ICD-10-CM

## 2024-07-10 PROCEDURE — 99212 OFFICE O/P EST SF 10 MIN: CPT
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
## 2024-07-12 DIAGNOSIS — S42.409A UNSPECIFIED FRACTURE OF LOWER END OF UNSPECIFIED HUMERUS, INITIAL ENCOUNTER FOR CLOSED FRACTURE: ICD-10-CM

## 2024-08-08 NOTE — ED ADULT NURSE NOTE - CODE STROKE ACTIVE YN
What Is The Reason For Today's Visit?: Full Body Skin Examination with No Concerns What Is The Reason For Today's Visit? (Being Monitored For X): concerning skin lesions on an annual basis No

## 2024-08-29 ENCOUNTER — APPOINTMENT (OUTPATIENT)
Dept: OTOLARYNGOLOGY | Facility: CLINIC | Age: 64
End: 2024-08-29
Payer: MEDICARE

## 2024-08-29 DIAGNOSIS — H90.3 SENSORINEURAL HEARING LOSS, BILATERAL: ICD-10-CM

## 2024-08-29 PROCEDURE — 92557 COMPREHENSIVE HEARING TEST: CPT

## 2024-08-29 PROCEDURE — 92550 TYMPANOMETRY & REFLEX THRESH: CPT | Mod: 52

## 2024-08-29 PROCEDURE — 99203 OFFICE O/P NEW LOW 30 MIN: CPT

## 2024-08-29 NOTE — HISTORY OF PRESENT ILLNESS
[Hearing Loss] : hearing loss [de-identified] : Patient presents today c/o hearing loss . He is accompanied by and aide. He has noticed some trouble hearing. Denies any otalgia, otorrhea, or tinnitus. States that hearing is worse on the right. Right ear feel clogged.  [Ear Fullness] : no ear fullness [Tinnitus] : no tinnitus [Anxiety] : no anxiety [Dizziness] : no dizziness [Headache] : no headache [Lightheadedness] : no lightheadedness [Neurologic Symptoms] : no associated neurologic symptoms [Orthostatic Hypotension] : no orthostatic hypotension [Otalgia] : no otalgia [Otorrhea] : no otorrhea [Vertigo] : no vertigo [Visual Changes] : no visual changes

## 2024-08-29 NOTE — PHYSICAL EXAM
[Normal] : mucosa is normal [Midline] : trachea located in midline position [de-identified] : poor dental